# Patient Record
Sex: MALE | Race: WHITE
[De-identification: names, ages, dates, MRNs, and addresses within clinical notes are randomized per-mention and may not be internally consistent; named-entity substitution may affect disease eponyms.]

---

## 2018-08-08 PROBLEM — Z00.00 ENCOUNTER FOR PREVENTIVE HEALTH EXAMINATION: Status: ACTIVE | Noted: 2018-08-08

## 2018-08-21 ENCOUNTER — RECORD ABSTRACTING (OUTPATIENT)
Age: 63
End: 2018-08-21

## 2018-08-21 DIAGNOSIS — Z78.9 OTHER SPECIFIED HEALTH STATUS: ICD-10-CM

## 2018-08-21 DIAGNOSIS — Z80.9 FAMILY HISTORY OF MALIGNANT NEOPLASM, UNSPECIFIED: ICD-10-CM

## 2018-08-21 LAB — HBA1C MFR BLD: 6.96

## 2018-08-21 RX ORDER — LANCETS
EACH MISCELLANEOUS
Refills: 0 | Status: ACTIVE | COMMUNITY

## 2018-08-21 RX ORDER — GLUCAGON 1 MG
1 KIT INJECTION
Refills: 0 | Status: ACTIVE | COMMUNITY

## 2018-09-05 ENCOUNTER — APPOINTMENT (OUTPATIENT)
Dept: ENDOCRINOLOGY | Facility: CLINIC | Age: 63
End: 2018-09-05
Payer: COMMERCIAL

## 2018-09-05 PROCEDURE — G0108 DIAB MANAGE TRN  PER INDIV: CPT

## 2018-09-05 RX ORDER — INSULIN GLARGINE 300 U/ML
300 INJECTION, SOLUTION SUBCUTANEOUS
Refills: 0 | Status: DISCONTINUED | COMMUNITY
End: 2018-09-05

## 2018-09-05 RX ORDER — INSULIN LISPRO 100 [IU]/ML
100 INJECTION, SOLUTION INTRAVENOUS; SUBCUTANEOUS
Refills: 0 | Status: DISCONTINUED | COMMUNITY
End: 2018-09-05

## 2018-09-05 RX ORDER — INSULIN HUMAN 100 [IU]/ML
100 INJECTION, SUSPENSION SUBCUTANEOUS
Refills: 0 | Status: DISCONTINUED | COMMUNITY
End: 2018-09-05

## 2018-09-05 RX ORDER — INSULIN HUMAN 100 [IU]/ML
100 INJECTION, SOLUTION PARENTERAL
Refills: 0 | Status: DISCONTINUED | COMMUNITY
End: 2018-09-05

## 2018-09-05 RX ORDER — SYRINGE AND NEEDLE,INSULIN,1ML 31 GX5/16"
SYRINGE, EMPTY DISPOSABLE MISCELLANEOUS
Refills: 0 | Status: DISCONTINUED | COMMUNITY
End: 2018-09-05

## 2018-09-05 RX ORDER — INSULIN ASPART 100 [IU]/ML
100 INJECTION, SOLUTION INTRAVENOUS; SUBCUTANEOUS
Refills: 0 | Status: DISCONTINUED | COMMUNITY
End: 2018-09-05

## 2018-09-13 ENCOUNTER — RECORD ABSTRACTING (OUTPATIENT)
Age: 63
End: 2018-09-13

## 2018-09-14 ENCOUNTER — APPOINTMENT (OUTPATIENT)
Dept: ENDOCRINOLOGY | Facility: CLINIC | Age: 63
End: 2018-09-14
Payer: COMMERCIAL

## 2018-09-14 VITALS
SYSTOLIC BLOOD PRESSURE: 110 MMHG | HEIGHT: 70.5 IN | OXYGEN SATURATION: 98 % | DIASTOLIC BLOOD PRESSURE: 68 MMHG | BODY MASS INDEX: 19.25 KG/M2 | HEART RATE: 77 BPM | WEIGHT: 136 LBS

## 2018-09-14 PROCEDURE — 99214 OFFICE O/P EST MOD 30 MIN: CPT

## 2018-10-30 ENCOUNTER — MEDICATION RENEWAL (OUTPATIENT)
Age: 63
End: 2018-10-30

## 2018-11-07 ENCOUNTER — APPOINTMENT (OUTPATIENT)
Dept: ENDOCRINOLOGY | Facility: CLINIC | Age: 63
End: 2018-11-07
Payer: COMMERCIAL

## 2018-11-07 PROCEDURE — G0108 DIAB MANAGE TRN  PER INDIV: CPT

## 2018-11-08 ENCOUNTER — MEDICATION RENEWAL (OUTPATIENT)
Age: 63
End: 2018-11-08

## 2018-11-08 RX ORDER — BLOOD-GLUCOSE SENSOR
EACH MISCELLANEOUS
Qty: 3 | Refills: 1 | Status: DISCONTINUED | COMMUNITY
End: 2018-11-08

## 2018-11-08 RX ORDER — BLOOD-GLUCOSE TRANSMITTER
EACH MISCELLANEOUS
Refills: 0 | Status: DISCONTINUED | COMMUNITY
End: 2018-11-08

## 2018-12-13 ENCOUNTER — MEDICATION RENEWAL (OUTPATIENT)
Age: 63
End: 2018-12-13

## 2018-12-13 RX ORDER — BLOOD-GLUCOSE TRANSMITTER
EACH MISCELLANEOUS
Qty: 1 | Refills: 3 | Status: DISCONTINUED | OUTPATIENT
Start: 2018-11-08 | End: 2018-12-13

## 2018-12-13 RX ORDER — BLOOD-GLUCOSE SENSOR
EACH MISCELLANEOUS
Qty: 3 | Refills: 3 | Status: DISCONTINUED | COMMUNITY
Start: 2018-11-08 | End: 2018-12-13

## 2018-12-13 RX ORDER — BLOOD-GLUCOSE,RECEIVER,CONT
EACH MISCELLANEOUS
Qty: 1 | Refills: 0 | Status: DISCONTINUED | COMMUNITY
Start: 2018-11-08 | End: 2018-12-13

## 2019-01-11 ENCOUNTER — RECORD ABSTRACTING (OUTPATIENT)
Age: 64
End: 2019-01-11

## 2019-01-11 DIAGNOSIS — Z86.39 PERSONAL HISTORY OF OTHER ENDOCRINE, NUTRITIONAL AND METABOLIC DISEASE: ICD-10-CM

## 2019-01-11 DIAGNOSIS — Z86.018 PERSONAL HISTORY OF OTHER BENIGN NEOPLASM: ICD-10-CM

## 2019-01-11 DIAGNOSIS — Z86.79 PERSONAL HISTORY OF OTHER DISEASES OF THE CIRCULATORY SYSTEM: ICD-10-CM

## 2019-03-15 ENCOUNTER — APPOINTMENT (OUTPATIENT)
Dept: ENDOCRINOLOGY | Facility: CLINIC | Age: 64
End: 2019-03-15
Payer: COMMERCIAL

## 2019-03-15 VITALS
DIASTOLIC BLOOD PRESSURE: 72 MMHG | SYSTOLIC BLOOD PRESSURE: 140 MMHG | HEIGHT: 70.5 IN | HEART RATE: 79 BPM | BODY MASS INDEX: 19.11 KG/M2 | WEIGHT: 135 LBS

## 2019-03-15 PROCEDURE — 99214 OFFICE O/P EST MOD 30 MIN: CPT

## 2019-03-15 RX ORDER — INSULIN GLARGINE 300 U/ML
300 INJECTION, SOLUTION SUBCUTANEOUS
Refills: 0 | Status: DISCONTINUED | COMMUNITY
End: 2019-03-15

## 2019-03-15 NOTE — REVIEW OF SYSTEMS
[Recent Weight Gain (___ Lbs)] : no recent weight gain [FreeTextEntry8] : no groin pain; recent hernia surgery successful

## 2019-03-15 NOTE — ASSESSMENT
[FreeTextEntry1] : DM type 1, good avoidance of severe hypoglycemia, Discussed driving precautions; pt. to scan glucose prior to driving and treat if low, or before low if trend arrow down. Adjust insulin based on juan download; morning R 2 units, evening R 5 units\par hyperlipdemia, on therapy\par hypertension stable

## 2019-03-15 NOTE — HISTORY OF PRESENT ILLNESS
[FreeTextEntry1] : DM type:1\par Severity:severe\par Duration:40 years\par \par \par Associated symptoms or complications:retinopathy, hypoglycemia unawareness\par \par Current control: very variable\par \par PMH:\par s/p subdural hematoma\par \par Now back on N and R insulin - patient much happier with this regimen; syringes\par 17NPH, 3R\par 14NPH, 4R\par \par \par \par Past regimen:\par Toujeo 20 units - am\par Humalog pre-meal \par 		2 units\par 	150-199	3\par 	200-249	4\par 	250-299	5\par 	300+	6\par plus 2,2,4\par \par past - VGO 20 - stopped VGO due to hyperglycemia episode over 500\par tresiba 20 (before VGO) and Humalog scale as above\par

## 2019-03-21 ENCOUNTER — RECORD ABSTRACTING (OUTPATIENT)
Age: 64
End: 2019-03-21

## 2019-03-22 ENCOUNTER — APPOINTMENT (OUTPATIENT)
Dept: ENDOCRINOLOGY | Facility: CLINIC | Age: 64
End: 2019-03-22
Payer: COMMERCIAL

## 2019-03-22 PROCEDURE — G0108 DIAB MANAGE TRN  PER INDIV: CPT

## 2019-04-04 ENCOUNTER — APPOINTMENT (OUTPATIENT)
Dept: ENDOCRINOLOGY | Facility: CLINIC | Age: 64
End: 2019-04-04

## 2019-04-05 ENCOUNTER — MEDICATION RENEWAL (OUTPATIENT)
Age: 64
End: 2019-04-05

## 2019-04-15 ENCOUNTER — RX RENEWAL (OUTPATIENT)
Age: 64
End: 2019-04-15

## 2019-05-17 ENCOUNTER — APPOINTMENT (OUTPATIENT)
Dept: ENDOCRINOLOGY | Facility: CLINIC | Age: 64
End: 2019-05-17
Payer: COMMERCIAL

## 2019-05-17 LAB — HBA1C MFR BLD HPLC: 7.1

## 2019-05-17 PROCEDURE — 95251 CONT GLUC MNTR ANALYSIS I&R: CPT

## 2019-05-17 PROCEDURE — G0108 DIAB MANAGE TRN  PER INDIV: CPT

## 2019-05-17 RX ORDER — INSULIN GLARGINE 300 U/ML
300 INJECTION, SOLUTION SUBCUTANEOUS
Refills: 0 | Status: DISCONTINUED | COMMUNITY
End: 2019-05-17

## 2019-05-17 RX ORDER — INSULIN LISPRO 100 [IU]/ML
100 INJECTION, SOLUTION INTRAVENOUS; SUBCUTANEOUS
Refills: 0 | Status: DISCONTINUED | COMMUNITY
End: 2019-05-17

## 2019-05-17 RX ORDER — INSULIN ASPART 100 [IU]/ML
100 INJECTION, SOLUTION INTRAVENOUS; SUBCUTANEOUS
Refills: 0 | Status: DISCONTINUED | COMMUNITY
End: 2019-05-17

## 2019-05-17 RX ORDER — GLIPIZIDE 10 MG/1
10 TABLET ORAL
Refills: 0 | Status: DISCONTINUED | COMMUNITY
End: 2019-05-17

## 2019-07-25 ENCOUNTER — APPOINTMENT (OUTPATIENT)
Dept: ENDOCRINOLOGY | Facility: CLINIC | Age: 64
End: 2019-07-25

## 2019-07-25 ENCOUNTER — RX RENEWAL (OUTPATIENT)
Age: 64
End: 2019-07-25

## 2019-07-25 VITALS
DIASTOLIC BLOOD PRESSURE: 70 MMHG | WEIGHT: 133 LBS | HEIGHT: 70 IN | BODY MASS INDEX: 19.04 KG/M2 | HEART RATE: 81 BPM | SYSTOLIC BLOOD PRESSURE: 110 MMHG

## 2019-08-08 ENCOUNTER — APPOINTMENT (OUTPATIENT)
Dept: ENDOCRINOLOGY | Facility: CLINIC | Age: 64
End: 2019-08-08
Payer: COMMERCIAL

## 2019-08-08 PROCEDURE — G0108 DIAB MANAGE TRN  PER INDIV: CPT

## 2019-08-22 ENCOUNTER — RX RENEWAL (OUTPATIENT)
Age: 64
End: 2019-08-22

## 2019-09-19 ENCOUNTER — APPOINTMENT (OUTPATIENT)
Dept: ENDOCRINOLOGY | Facility: CLINIC | Age: 64
End: 2019-09-19

## 2019-11-18 ENCOUNTER — OTHER (OUTPATIENT)
Age: 64
End: 2019-11-18

## 2019-12-09 LAB
HBA1C MFR BLD HPLC: 7.1
LDLC SERPL DIRECT ASSAY-MCNC: 113
MICROALBUMIN/CREAT UR-RTO: 15

## 2019-12-11 ENCOUNTER — APPOINTMENT (OUTPATIENT)
Dept: ENDOCRINOLOGY | Facility: CLINIC | Age: 64
End: 2019-12-11
Payer: COMMERCIAL

## 2019-12-11 VITALS
SYSTOLIC BLOOD PRESSURE: 118 MMHG | OXYGEN SATURATION: 98 % | HEIGHT: 70 IN | BODY MASS INDEX: 19.33 KG/M2 | WEIGHT: 135 LBS | DIASTOLIC BLOOD PRESSURE: 64 MMHG | HEART RATE: 79 BPM

## 2019-12-11 PROCEDURE — 99214 OFFICE O/P EST MOD 30 MIN: CPT

## 2019-12-11 NOTE — ASSESSMENT
[FreeTextEntry1] : DM type 1, good avoidance of severe hypoglycemia, Discussed driving precautions; pt. to scan glucose prior to driving and treat if low, or before low if trend arrow down. Adjust insulin based on juan download; insulin regimen 16/2,11/5\par hyperlipdemia, on therapy\par hypertension stable

## 2019-12-11 NOTE — HISTORY OF PRESENT ILLNESS
[FreeTextEntry1] : DM type:1\par Severity:severe\par Duration:40 years\par \par \par Associated symptoms or complications:retinopathy, hypoglycemia unawareness\par \par Current control: very variable\par \par PMH:\par s/p subdural hematoma\par \par Now back on N and R insulin - patient much happier with this regimen; syringes\par 17NPH, 2R\par 12NPH, 6R\par \par \par \par Past regimen:\par Toujeo 20 units - am\par Humalog pre-meal \par 		2 units\par 	150-199	3\par 	200-249	4\par 	250-299	5\par 	300+	6\par plus 2,2,4\par \par past - VGO 20 - stopped VGO due to hyperglycemia episode over 500\par tresiba 20 (before VGO) and Humalog scale as above\par

## 2019-12-23 ENCOUNTER — APPOINTMENT (OUTPATIENT)
Dept: ENDOCRINOLOGY | Facility: CLINIC | Age: 64
End: 2019-12-23

## 2020-04-13 ENCOUNTER — APPOINTMENT (OUTPATIENT)
Dept: ENDOCRINOLOGY | Facility: CLINIC | Age: 65
End: 2020-04-13
Payer: COMMERCIAL

## 2020-04-13 PROCEDURE — G2012 BRIEF CHECK IN BY MD/QHP: CPT

## 2020-08-26 LAB
HBA1C MFR BLD HPLC: 6.9
LDLC SERPL DIRECT ASSAY-MCNC: 121
MICROALBUMIN/CREAT 24H UR-RTO: 17

## 2020-08-27 ENCOUNTER — RESULT CHARGE (OUTPATIENT)
Age: 65
End: 2020-08-27

## 2020-08-27 ENCOUNTER — APPOINTMENT (OUTPATIENT)
Dept: ENDOCRINOLOGY | Facility: CLINIC | Age: 65
End: 2020-08-27
Payer: COMMERCIAL

## 2020-08-27 VITALS
DIASTOLIC BLOOD PRESSURE: 70 MMHG | WEIGHT: 128.03 LBS | OXYGEN SATURATION: 97 % | HEIGHT: 70 IN | BODY MASS INDEX: 18.33 KG/M2 | SYSTOLIC BLOOD PRESSURE: 102 MMHG | HEART RATE: 80 BPM

## 2020-08-27 LAB — GLUCOSE BLDC GLUCOMTR-MCNC: 144

## 2020-08-27 PROCEDURE — 82962 GLUCOSE BLOOD TEST: CPT

## 2020-08-27 PROCEDURE — 99214 OFFICE O/P EST MOD 30 MIN: CPT | Mod: 25

## 2020-08-27 NOTE — HISTORY OF PRESENT ILLNESS
[FreeTextEntry1] : DM type:1\par Severity:severe\par Duration:41 years\par \par \par Associated symptoms or complications:retinopathy, hypoglycemia unawareness\par \par Current control: very variable\par \par PMH:\par s/p subdural hematoma\par \par Now back on N and R insulin - patient much happier with this regimen; syringes\par 16NPH, 2R\par 11NPH, 5R\par \par \par \par Past regimen:\par Toujeo 20 units - am\par Humalog pre-meal \par 		2 units\par 	150-199	3\par 	200-249	4\par 	250-299	5\par 	300+	6\par plus 2,2,4\par \par past - VGO 20 - stopped VGO due to hyperglycemia episode over 500\par tresiba 20 (before VGO) and Humalog scale as above\par

## 2020-08-27 NOTE — REVIEW OF SYSTEMS
[Recent Weight Loss (___ Lbs)] : recent weight loss: [unfilled] lbs [Chest Pain] : no chest pain [Shortness Of Breath] : no shortness of breath

## 2020-08-27 NOTE — ASSESSMENT
[FreeTextEntry1] : DM type 1, variable control. Decrease NPH by one unit\par hyperlipidemia, on therapy\par hypertension stable

## 2021-01-04 ENCOUNTER — APPOINTMENT (OUTPATIENT)
Dept: ENDOCRINOLOGY | Facility: CLINIC | Age: 66
End: 2021-01-04
Payer: COMMERCIAL

## 2021-01-04 VITALS
WEIGHT: 128 LBS | HEIGHT: 70 IN | BODY MASS INDEX: 18.32 KG/M2 | SYSTOLIC BLOOD PRESSURE: 120 MMHG | OXYGEN SATURATION: 99 % | HEART RATE: 84 BPM | DIASTOLIC BLOOD PRESSURE: 80 MMHG

## 2021-01-04 PROCEDURE — 99214 OFFICE O/P EST MOD 30 MIN: CPT

## 2021-01-04 PROCEDURE — 99072 ADDL SUPL MATRL&STAF TM PHE: CPT

## 2021-01-04 NOTE — HISTORY OF PRESENT ILLNESS
[FreeTextEntry1] : DM type:1\par Severity:severe\par Duration:42 years\par \par \par Associated symptoms or complications:retinopathy, hypoglycemia unawareness\par \par Current control: very variable\par \par PMH:\par s/p subdural hematoma\par \par Now back on N and R insulin - patient much happier with this regimen; syringes\par 15NPH, 2R\par 10NPH, 5R\par \par \par \par Past regimen:\par Toujeo 20 units - am\par Humalog pre-meal \par 		2 units\par 	150-199	3\par 	200-249	4\par 	250-299	5\par 	300+	6\par plus 2,2,4\par \par past - VGO 20 - stopped VGO due to hyperglycemia episode over 500\par tresiba 20 (before VGO) and Humalog scale as above\par

## 2021-02-24 ENCOUNTER — APPOINTMENT (OUTPATIENT)
Dept: ENDOCRINOLOGY | Facility: CLINIC | Age: 66
End: 2021-02-24
Payer: COMMERCIAL

## 2021-02-24 LAB — HBA1C MFR BLD HPLC: 7.7

## 2021-02-24 PROCEDURE — G0108 DIAB MANAGE TRN  PER INDIV: CPT

## 2021-02-24 PROCEDURE — 99072 ADDL SUPL MATRL&STAF TM PHE: CPT

## 2021-03-26 ENCOUNTER — APPOINTMENT (OUTPATIENT)
Dept: ENDOCRINOLOGY | Facility: CLINIC | Age: 66
End: 2021-03-26

## 2021-05-11 LAB
HBA1C MFR BLD HPLC: 8.1
LDLC SERPL DIRECT ASSAY-MCNC: 84

## 2021-05-13 ENCOUNTER — APPOINTMENT (OUTPATIENT)
Dept: ENDOCRINOLOGY | Facility: CLINIC | Age: 66
End: 2021-05-13
Payer: COMMERCIAL

## 2021-05-13 VITALS
DIASTOLIC BLOOD PRESSURE: 70 MMHG | HEART RATE: 85 BPM | BODY MASS INDEX: 17.9 KG/M2 | OXYGEN SATURATION: 97 % | SYSTOLIC BLOOD PRESSURE: 110 MMHG | WEIGHT: 125 LBS | HEIGHT: 70 IN

## 2021-05-13 PROCEDURE — 99072 ADDL SUPL MATRL&STAF TM PHE: CPT

## 2021-05-13 PROCEDURE — 99214 OFFICE O/P EST MOD 30 MIN: CPT

## 2021-05-13 NOTE — ASSESSMENT
[FreeTextEntry1] : DM type 1, variable control. No changes needed\par hyperlipidemia, on therapy\par hypertension stable

## 2021-05-13 NOTE — HISTORY OF PRESENT ILLNESS
[FreeTextEntry1] : DM type:1\par Severity:severe\par Duration:42 years\par \par \par Associated symptoms or complications:retinopathy, hypoglycemia unawareness\par has foot wound, followed by podiatry and going to wound center. ON levofloxin and silvadene \par \par Current control: very variable\par \par PMH:\par s/p subdural hematoma\par \par Now back on N and R insulin - patient much happier with this regimen; syringes\par 15NPH, 2R\par 10NPH, 5R\par \par \par \par Past regimen:\par Toujeo 20 units - am\par Humalog pre-meal \par 		2 units\par 	150-199	3\par 	200-249	4\par 	250-299	5\par 	300+	6\par plus 2,2,4\par \par past - VGO 20 - stopped VGO due to hyperglycemia episode over 500\par tresiba 20 (before VGO) and Humalog scale as above\par

## 2021-07-20 ENCOUNTER — APPOINTMENT (OUTPATIENT)
Dept: ENDOCRINOLOGY | Facility: CLINIC | Age: 66
End: 2021-07-20
Payer: COMMERCIAL

## 2021-07-20 PROCEDURE — 99072 ADDL SUPL MATRL&STAF TM PHE: CPT

## 2021-07-20 PROCEDURE — G0108 DIAB MANAGE TRN  PER INDIV: CPT

## 2021-08-24 ENCOUNTER — APPOINTMENT (OUTPATIENT)
Dept: ENDOCRINOLOGY | Facility: CLINIC | Age: 66
End: 2021-08-24
Payer: COMMERCIAL

## 2021-08-24 PROCEDURE — G0108 DIAB MANAGE TRN  PER INDIV: CPT

## 2021-08-25 LAB — HBA1C MFR BLD HPLC: 6.8

## 2021-08-30 LAB
LDLC SERPL DIRECT ASSAY-MCNC: 126
MICROALBUMIN/CREAT 24H UR-RTO: 10

## 2021-09-03 ENCOUNTER — APPOINTMENT (OUTPATIENT)
Dept: ENDOCRINOLOGY | Facility: CLINIC | Age: 66
End: 2021-09-03
Payer: MEDICARE

## 2021-09-03 VITALS
DIASTOLIC BLOOD PRESSURE: 70 MMHG | HEIGHT: 70 IN | HEART RATE: 76 BPM | SYSTOLIC BLOOD PRESSURE: 110 MMHG | WEIGHT: 120.5 LBS | BODY MASS INDEX: 17.25 KG/M2

## 2021-09-03 PROCEDURE — 95251 CONT GLUC MNTR ANALYSIS I&R: CPT

## 2021-09-03 PROCEDURE — 99214 OFFICE O/P EST MOD 30 MIN: CPT | Mod: 25

## 2021-09-12 ENCOUNTER — RX RENEWAL (OUTPATIENT)
Age: 66
End: 2021-09-12

## 2021-10-26 ENCOUNTER — APPOINTMENT (OUTPATIENT)
Dept: ENDOCRINOLOGY | Facility: CLINIC | Age: 66
End: 2021-10-26
Payer: MEDICARE

## 2021-10-26 PROCEDURE — G0108 DIAB MANAGE TRN  PER INDIV: CPT

## 2021-10-29 ENCOUNTER — NON-APPOINTMENT (OUTPATIENT)
Age: 66
End: 2021-10-29

## 2021-12-14 ENCOUNTER — APPOINTMENT (OUTPATIENT)
Dept: ENDOCRINOLOGY | Facility: CLINIC | Age: 66
End: 2021-12-14
Payer: MEDICARE

## 2021-12-14 DIAGNOSIS — E10.65 TYPE 1 DIABETES MELLITUS WITH HYPERGLYCEMIA: ICD-10-CM

## 2021-12-14 PROCEDURE — 97803 MED NUTRITION INDIV SUBSEQ: CPT

## 2021-12-21 ENCOUNTER — NON-APPOINTMENT (OUTPATIENT)
Age: 66
End: 2021-12-21

## 2022-01-05 NOTE — HISTORY OF PRESENT ILLNESS
[Continuous Glucose Monitoring] : Continuous Glucose Monitoring: Yes [[place]] : [place] [FreeTextEntry1] : DM type:1\par Severity:severe\par Duration:42 years\par \par \par Associated symptoms or complications:retinopathy, hypoglycemia unawareness\par has foot wound, followed by podiatry and going to wound center. ON levofloxin and silvadene \par \par Current control: very variable\par \par PMH:\par s/p subdural hematoma\par \par Now back on N and R insulin - patient much happier with this regimen; syringes\par 15NPH, 2R\par 10NPH, 5R\par \par started jardiance and noted decreased urination in the afternoon\par \par \par \par Past regimen:\par Toujeo 20 units - am\par Humalog pre-meal \par 		2 units\par 	150-199	3\par 	200-249	4\par 	250-299	5\par 	300+	6\par plus 2,2,4\par \par past - VGO 20 - stopped VGO due to hyperglycemia episode over 500\par tresiba 20 (before VGO) and Humalog scale as above\par  [FreeTextEntry2] : 57 [FreeTextEntry3] : 33 [FreeTextEntry4] : 10 [de-identified] : 7.1 [FreeTextEntry5] : 158 [FreeTextEntry6] : 51.7

## 2022-01-05 NOTE — ASSESSMENT
[FreeTextEntry1] : DM type 1, variable control. d/c jardiance due to underlying type 1 DM and risk of DKA\par hyperlipidemia, on therapy\par hypertension stable\par \par Pt. was given a no cost juan 14 day reader by our office on December 13 2018

## 2022-01-10 RX ORDER — FLASH GLUCOSE SENSOR
KIT MISCELLANEOUS
Qty: 6 | Refills: 1 | Status: ACTIVE | COMMUNITY
Start: 2022-01-10 | End: 1900-01-01

## 2022-02-09 ENCOUNTER — APPOINTMENT (OUTPATIENT)
Dept: ENDOCRINOLOGY | Facility: CLINIC | Age: 67
End: 2022-02-09
Payer: MEDICARE

## 2022-02-09 PROCEDURE — 97803 MED NUTRITION INDIV SUBSEQ: CPT

## 2022-04-29 LAB
HBA1C MFR BLD HPLC: 7.1
LDLC SERPL DIRECT ASSAY-MCNC: 142
MICROALBUMIN/CREAT 24H UR-RTO: 10

## 2022-05-02 ENCOUNTER — APPOINTMENT (OUTPATIENT)
Dept: ENDOCRINOLOGY | Facility: CLINIC | Age: 67
End: 2022-05-02
Payer: MEDICARE

## 2022-05-02 VITALS
HEIGHT: 70 IN | DIASTOLIC BLOOD PRESSURE: 70 MMHG | BODY MASS INDEX: 17.32 KG/M2 | WEIGHT: 121 LBS | SYSTOLIC BLOOD PRESSURE: 130 MMHG | HEART RATE: 85 BPM

## 2022-05-02 DIAGNOSIS — I10 ESSENTIAL (PRIMARY) HYPERTENSION: ICD-10-CM

## 2022-05-02 PROCEDURE — 99214 OFFICE O/P EST MOD 30 MIN: CPT | Mod: 25

## 2022-05-02 PROCEDURE — 95251 CONT GLUC MNTR ANALYSIS I&R: CPT

## 2022-05-02 NOTE — ASSESSMENT
[FreeTextEntry1] : DM type 1,extreme variability. Pt. is eating one meal a day at random times. Injects his insulin q12h (7:30) without regard to meals. Discussed hypoglycemia in an effort to limit pm dose, but pt. reluctant to make changes because he knows his body. ADvised ongoing use of juan device (he did not like the dexcom) and to pay attention to readings. Has follow up with [facility]; maybe work in some snacks?\par hyperlipidemia, on therapy\par hypertension stable\par \par

## 2022-05-02 NOTE — HISTORY OF PRESENT ILLNESS
[Continuous Glucose Monitoring] : Continuous Glucose Monitoring: Yes [[place]] : [place] [FreeTextEntry1] : DM type:1\par Severity:severe\par Duration:43 years\par \par \par Associated symptoms or complications:retinopathy, hypoglycemia unawareness\par had foot wound, healed\par \par Current control: very variable\par \par PMH:\par s/p subdural hematoma\par \par Now back on N and R insulin - patient much happier with this regimen; syringes\par 15NPH, 2R\par 9NPH, 11R - or higher if glucose high\par \par started jardiance and noted decreased urination in the afternoon\par \par \par \par Past regimen:\par Toujeo 20 units - am\par Humalog pre-meal \par 		2 units\par 	150-199	3\par 	200-249	4\par 	250-299	5\par 	300+	6\par plus 2,2,4\par \par past - VGO 20 - stopped VGO due to hyperglycemia episode over 500\par tresiba 20 (before VGO) and Humalog scale as above\par  [FreeTextEntry2] : 42 [FreeTextEntry3] : 50 [FreeTextEntry4] : 8 [de-identified] : 8.5 [FreeTextEntry5] : 215 [FreeTextEntry6] : 60.2

## 2022-06-14 ENCOUNTER — APPOINTMENT (OUTPATIENT)
Dept: ENDOCRINOLOGY | Facility: CLINIC | Age: 67
End: 2022-06-14
Payer: MEDICARE

## 2022-06-14 PROCEDURE — 97803 MED NUTRITION INDIV SUBSEQ: CPT

## 2022-07-27 ENCOUNTER — APPOINTMENT (OUTPATIENT)
Dept: ENDOCRINOLOGY | Facility: CLINIC | Age: 67
End: 2022-07-27

## 2022-07-27 PROCEDURE — G0108 DIAB MANAGE TRN  PER INDIV: CPT

## 2022-09-14 ENCOUNTER — APPOINTMENT (OUTPATIENT)
Dept: ENDOCRINOLOGY | Facility: CLINIC | Age: 67
End: 2022-09-14

## 2022-09-14 VITALS
HEART RATE: 79 BPM | SYSTOLIC BLOOD PRESSURE: 120 MMHG | BODY MASS INDEX: 17.75 KG/M2 | HEIGHT: 70 IN | WEIGHT: 124 LBS | DIASTOLIC BLOOD PRESSURE: 70 MMHG

## 2022-09-14 PROCEDURE — 99214 OFFICE O/P EST MOD 30 MIN: CPT | Mod: 25

## 2022-09-14 PROCEDURE — 95251 CONT GLUC MNTR ANALYSIS I&R: CPT

## 2022-09-14 NOTE — ASSESSMENT
[FreeTextEntry1] : DM type 1,extreme variability. Pt. is eating one meal a day at random times. Injects his insulin q12h (7:30) without regard to meals. Discussed hypoglycemia in an effort to limit pm dose, but pt. reluctant to make changes because he knows his body. ADvised ongoing use of juan device (he did not like the dexcom) and to pay attention to readings. Encouraged to reduce insulin dose.\par hyperlipidemia, on therapy\par hypertension stable\par \par

## 2022-09-14 NOTE — HISTORY OF PRESENT ILLNESS
[Continuous Glucose Monitoring] : Continuous Glucose Monitoring: Yes [[place]] : [place] [FreeTextEntry1] : DM type:1\par Severity:severe\par Duration:43 years\par \par \par Associated symptoms or complications:retinopathy, hypoglycemia unawareness\par had foot wound, healed\par \par Current control: very variable\par \par PMH:\par s/p subdural hematoma\par \par Now back on N and R insulin - patient much happier with this regimen; syringes\par 15NPH, 3R\par 9NPH, 11R - or higher if glucose high\par \par started jardiance and noted decreased urination in the afternoon\par \par \par \par Past regimen:\par Toujeo 20 units - am\par Humalog pre-meal \par 		2 units\par 	150-199	3\par 	200-249	4\par 	250-299	5\par 	300+	6\par plus 2,2,4\par \par past - VGO 20 - stopped VGO due to hyperglycemia episode over 500\par tresiba 20 (before VGO) and Humalog scale as above\par  [FreeTextEntry2] : 58 [FreeTextEntry3] : 26 [FreeTextEntry4] : 16 [de-identified] : 6.8 [FreeTextEntry5] : 147 [FreeTextEntry6] : 56.6

## 2022-09-22 ENCOUNTER — APPOINTMENT (OUTPATIENT)
Dept: ENDOCRINOLOGY | Facility: CLINIC | Age: 67
End: 2022-09-22

## 2022-10-03 ENCOUNTER — RX RENEWAL (OUTPATIENT)
Age: 67
End: 2022-10-03

## 2022-10-26 ENCOUNTER — APPOINTMENT (OUTPATIENT)
Dept: ENDOCRINOLOGY | Facility: CLINIC | Age: 67
End: 2022-10-26

## 2022-10-26 PROCEDURE — 97803 MED NUTRITION INDIV SUBSEQ: CPT | Mod: GA

## 2022-11-16 ENCOUNTER — RX RENEWAL (OUTPATIENT)
Age: 67
End: 2022-11-16

## 2022-12-12 ENCOUNTER — OFFICE (OUTPATIENT)
Dept: URBAN - METROPOLITAN AREA CLINIC 105 | Facility: CLINIC | Age: 67
Setting detail: OPHTHALMOLOGY
End: 2022-12-12
Payer: MEDICARE

## 2022-12-12 DIAGNOSIS — E10.3312: ICD-10-CM

## 2022-12-12 DIAGNOSIS — E10.3313: ICD-10-CM

## 2022-12-12 PROCEDURE — 92134 CPTRZ OPH DX IMG PST SGM RTA: CPT | Performed by: OPHTHALMOLOGY

## 2022-12-12 PROCEDURE — 92235 FLUORESCEIN ANGRPH MLTIFRAME: CPT | Performed by: OPHTHALMOLOGY

## 2022-12-12 PROCEDURE — 67210 TREATMENT OF RETINAL LESION: CPT | Performed by: OPHTHALMOLOGY

## 2022-12-12 ASSESSMENT — VISUAL ACUITY
OS_BCVA: 20/40-1
OD_BCVA: 20/40

## 2022-12-12 ASSESSMENT — AXIALLENGTH_DERIVED
OD_AL: 23.8408
OS_AL: 24.0443

## 2022-12-12 ASSESSMENT — KERATOMETRY
OS_K2POWER_DIOPTERS: 44.00
OD_K2POWER_DIOPTERS: 44.25
OS_K1POWER_DIOPTERS: 43.50
OD_K1POWER_DIOPTERS: 43.00
OD_AXISANGLE_DEGREES: 017
OS_AXISANGLE_DEGREES: 171

## 2022-12-12 ASSESSMENT — REFRACTION_AUTOREFRACTION
OS_CYLINDER: -1.75
OD_CYLINDER: -2.00
OS_SPHERE: -0.50
OS_AXIS: 075
OD_AXIS: 116
OD_SPHERE: +0.25

## 2022-12-12 ASSESSMENT — SPHEQUIV_DERIVED
OS_SPHEQUIV: -1.375
OD_SPHEQUIV: -0.75

## 2022-12-12 ASSESSMENT — CONFRONTATIONAL VISUAL FIELD TEST (CVF)
OS_FINDINGS: FULL
OD_FINDINGS: FULL

## 2022-12-13 ENCOUNTER — APPOINTMENT (OUTPATIENT)
Dept: ENDOCRINOLOGY | Facility: CLINIC | Age: 67
End: 2022-12-13

## 2022-12-13 PROCEDURE — G0108 DIAB MANAGE TRN  PER INDIV: CPT

## 2022-12-13 RX ORDER — BLOOD SUGAR DIAGNOSTIC
STRIP MISCELLANEOUS 3 TIMES DAILY
Qty: 6 | Refills: 1 | Status: DISCONTINUED | COMMUNITY
Start: 2022-01-10 | End: 2022-12-13

## 2022-12-13 RX ORDER — BLOOD SUGAR DIAGNOSTIC
STRIP MISCELLANEOUS 4 TIMES DAILY
Qty: 400 | Refills: 3 | Status: DISCONTINUED | COMMUNITY
End: 2022-12-13

## 2022-12-13 RX ORDER — FLASH GLUCOSE SCANNING READER
EACH MISCELLANEOUS
Qty: 1 | Refills: 0 | Status: DISCONTINUED | COMMUNITY
Start: 2018-12-13 | End: 2022-12-13

## 2022-12-13 RX ORDER — BLOOD SUGAR DIAGNOSTIC
STRIP MISCELLANEOUS
Qty: 300 | Refills: 3 | Status: DISCONTINUED | COMMUNITY
Start: 2020-08-27 | End: 2022-12-13

## 2022-12-13 RX ORDER — FLASH GLUCOSE SENSOR
KIT MISCELLANEOUS
Qty: 6 | Refills: 3 | Status: DISCONTINUED | COMMUNITY
Start: 2018-12-13 | End: 2022-12-13

## 2023-01-23 ENCOUNTER — OFFICE (OUTPATIENT)
Dept: URBAN - METROPOLITAN AREA CLINIC 105 | Facility: CLINIC | Age: 68
Setting detail: OPHTHALMOLOGY
End: 2023-01-23
Payer: MEDICARE

## 2023-01-23 DIAGNOSIS — E10.3311: ICD-10-CM

## 2023-01-23 PROCEDURE — 67210 TREATMENT OF RETINAL LESION: CPT | Performed by: OPHTHALMOLOGY

## 2023-01-23 ASSESSMENT — KERATOMETRY
OD_K2POWER_DIOPTERS: 44.25
OD_K1POWER_DIOPTERS: 43.00
OS_K2POWER_DIOPTERS: 44.00
OD_AXISANGLE_DEGREES: 017
OS_AXISANGLE_DEGREES: 171
OS_K1POWER_DIOPTERS: 43.50

## 2023-01-23 ASSESSMENT — AXIALLENGTH_DERIVED
OD_AL: 23.8408
OS_AL: 24.0443

## 2023-01-23 ASSESSMENT — REFRACTION_AUTOREFRACTION
OS_AXIS: 075
OD_AXIS: 116
OD_SPHERE: +0.25
OS_CYLINDER: -1.75
OD_CYLINDER: -2.00
OS_SPHERE: -0.50

## 2023-01-23 ASSESSMENT — SPHEQUIV_DERIVED
OS_SPHEQUIV: -1.375
OD_SPHEQUIV: -0.75

## 2023-01-23 ASSESSMENT — CONFRONTATIONAL VISUAL FIELD TEST (CVF)
OD_FINDINGS: FULL
OS_FINDINGS: FULL

## 2023-01-23 ASSESSMENT — VISUAL ACUITY
OD_BCVA: 20/40-2
OS_BCVA: 20/40

## 2023-02-14 ENCOUNTER — APPOINTMENT (OUTPATIENT)
Dept: ENDOCRINOLOGY | Facility: CLINIC | Age: 68
End: 2023-02-14
Payer: MEDICARE

## 2023-02-14 LAB
HBA1C MFR BLD HPLC: 7
LDLC SERPL DIRECT ASSAY-MCNC: 144
MICROALBUMIN/CREAT 24H UR-RTO: 19
TSH SERPL-ACNC: 2.14

## 2023-02-14 PROCEDURE — 97803 MED NUTRITION INDIV SUBSEQ: CPT | Mod: GA

## 2023-02-15 ENCOUNTER — APPOINTMENT (OUTPATIENT)
Dept: ENDOCRINOLOGY | Facility: CLINIC | Age: 68
End: 2023-02-15
Payer: MEDICARE

## 2023-02-15 VITALS
HEIGHT: 70 IN | DIASTOLIC BLOOD PRESSURE: 70 MMHG | HEART RATE: 85 BPM | WEIGHT: 130 LBS | SYSTOLIC BLOOD PRESSURE: 124 MMHG | BODY MASS INDEX: 18.61 KG/M2

## 2023-02-15 PROCEDURE — 99214 OFFICE O/P EST MOD 30 MIN: CPT

## 2023-02-15 NOTE — HISTORY OF PRESENT ILLNESS
[Continuous Glucose Monitoring] : Continuous Glucose Monitoring: Yes [[place]] : [place] [FreeTextEntry1] : DM type:1\par Severity:severe\par Duration:44 years\par \par \par Associated symptoms or complications:retinopathy, hypoglycemia unawareness\par had foot wound, healed\par \par Current control: very variable\par \par PMH:\par s/p subdural hematoma\par \par Now back on N and R insulin - patient much happier with this regimen; syringes\par 15NPH, 3R\par 9NPH, 11R - or higher if glucose high\par \par started jardiance and noted decreased urination in the afternoon\par \par Past regimen:\par Toujeo 20 units - am\par Humalog pre-meal \par 		2 units\par 	150-199	3\par 	200-249	4\par 	250-299	5\par 	300+	6\par plus 2,2,4\par \par past - VGO 20 - stopped VGO due to hyperglycemia episode over 500\par tresiba 20 (before VGO) and Humalog scale as above\par  [FreeTextEntry2] : 48 [FreeTextEntry3] : 44 [FreeTextEntry4] : 10 [de-identified] : 7.7 [FreeTextEntry5] : 182 [FreeTextEntry6] : 53.8

## 2023-02-15 NOTE — ASSESSMENT
[FreeTextEntry1] : DM type 1,extreme variability. Pt. is not amenable to recommendations for insulin use\par hyperlipidemia, on therapy. INcrease simvastatin to 40 mg\par hypertension stable\par \par

## 2023-04-11 ENCOUNTER — OFFICE (OUTPATIENT)
Dept: URBAN - METROPOLITAN AREA CLINIC 105 | Facility: CLINIC | Age: 68
Setting detail: OPHTHALMOLOGY
End: 2023-04-11
Payer: MEDICARE

## 2023-04-11 DIAGNOSIS — H40.033: ICD-10-CM

## 2023-04-11 DIAGNOSIS — H52.223: ICD-10-CM

## 2023-04-11 DIAGNOSIS — H25.013: ICD-10-CM

## 2023-04-11 DIAGNOSIS — H52.4: ICD-10-CM

## 2023-04-11 PROCEDURE — 99214 OFFICE O/P EST MOD 30 MIN: CPT | Performed by: OPTOMETRIST

## 2023-04-11 PROCEDURE — 92015 DETERMINE REFRACTIVE STATE: CPT | Performed by: OPTOMETRIST

## 2023-04-11 ASSESSMENT — AXIALLENGTH_DERIVED
OD_AL: 23.5461
OS_AL: 23.794
OS_AL: 23.6463
OD_AL: 23.5461

## 2023-04-11 ASSESSMENT — SPHEQUIV_DERIVED
OS_SPHEQUIV: -0.375
OD_SPHEQUIV: 0
OD_SPHEQUIV: 0
OS_SPHEQUIV: -0.75

## 2023-04-11 ASSESSMENT — CONFRONTATIONAL VISUAL FIELD TEST (CVF)
OD_FINDINGS: FULL
OS_FINDINGS: FULL

## 2023-04-11 ASSESSMENT — REFRACTION_CURRENTRX
OS_CYLINDER: -1.00
OS_AXIS: 082
OD_CYLINDER: -1.25
OD_SPHERE: -0.75
OD_AXIS: 118
OS_VPRISM_DIRECTION: PROGS
OS_OVR_VA: 20/
OS_SPHERE: -1.00
OD_VPRISM_DIRECTION: PROGS
OD_OVR_VA: 20/
OS_ADD: +1.75
OD_ADD: +1.75

## 2023-04-11 ASSESSMENT — VISUAL ACUITY
OS_BCVA: 20/40
OD_BCVA: 20/60-1

## 2023-04-11 ASSESSMENT — REFRACTION_MANIFEST
OS_AXIS: 070
OS_SPHERE: +0.25
OS_ADD: +2.50
OD_ADD: +2.50
OS_CYLINDER: -1.25
OU_VA: 20/30-2
OD_AXIS: 110
OS_VA1: 20/60
OD_VA1: 20/40
OD_CYLINDER: -1.50
OD_SPHERE: +0.75

## 2023-04-11 ASSESSMENT — REFRACTION_AUTOREFRACTION
OS_AXIS: 078
OS_CYLINDER: -1.50
OD_SPHERE: +1.25
OS_SPHERE: 0.00
OD_AXIS: 116
OD_CYLINDER: -2.50

## 2023-04-11 ASSESSMENT — KERATOMETRY
OS_K2POWER_DIOPTERS: 44.00
OS_AXISANGLE_DEGREES: 173
OD_K1POWER_DIOPTERS: 42.75
OS_K1POWER_DIOPTERS: 43.50
OD_K2POWER_DIOPTERS: 44.50
OD_AXISANGLE_DEGREES: 015

## 2023-04-11 ASSESSMENT — TONOMETRY
OD_IOP_MMHG: 17
OS_IOP_MMHG: 18

## 2023-04-17 ENCOUNTER — OFFICE (OUTPATIENT)
Dept: URBAN - METROPOLITAN AREA CLINIC 113 | Facility: CLINIC | Age: 68
Setting detail: OPHTHALMOLOGY
End: 2023-04-17
Payer: MEDICARE

## 2023-04-17 DIAGNOSIS — H40.033: ICD-10-CM

## 2023-04-17 DIAGNOSIS — H25.013: ICD-10-CM

## 2023-04-17 PROCEDURE — 92020 GONIOSCOPY: CPT | Performed by: STUDENT IN AN ORGANIZED HEALTH CARE EDUCATION/TRAINING PROGRAM

## 2023-04-17 PROCEDURE — 99214 OFFICE O/P EST MOD 30 MIN: CPT | Performed by: STUDENT IN AN ORGANIZED HEALTH CARE EDUCATION/TRAINING PROGRAM

## 2023-04-17 ASSESSMENT — KERATOMETRY
OS_AXISANGLE_DEGREES: 179
OS_K2POWER_DIOPTERS: 44.00
OD_K1POWER_DIOPTERS: 43.00
OD_AXISANGLE_DEGREES: 011
OS_K1POWER_DIOPTERS: 43.00
OD_K2POWER_DIOPTERS: 44.25

## 2023-04-17 ASSESSMENT — REFRACTION_CURRENTRX
OD_VPRISM_DIRECTION: PROGS
OD_SPHERE: -0.75
OD_ADD: +1.75
OD_CYLINDER: -1.25
OS_CYLINDER: -1.00
OD_OVR_VA: 20/
OS_VPRISM_DIRECTION: PROGS
OS_AXIS: 082
OS_ADD: +1.75
OD_AXIS: 118
OS_SPHERE: -1.00
OS_OVR_VA: 20/

## 2023-04-17 ASSESSMENT — CONFRONTATIONAL VISUAL FIELD TEST (CVF)
OS_FINDINGS: FULL
OD_FINDINGS: FULL

## 2023-04-17 ASSESSMENT — REFRACTION_MANIFEST
OD_SPHERE: +0.75
OD_ADD: +2.50
OS_CYLINDER: -1.25
OD_VA1: 20/40
OS_ADD: +2.50
OS_VA1: 20/60
OD_CYLINDER: -1.50
OS_AXIS: 070
OD_AXIS: 110
OU_VA: 20/30-2
OS_SPHERE: +0.25

## 2023-04-17 ASSESSMENT — REFRACTION_AUTOREFRACTION
OD_AXIS: 116
OD_SPHERE: +1.25
OS_SPHERE: 0.00
OS_CYLINDER: -1.50
OD_CYLINDER: -2.50
OS_AXIS: 078

## 2023-04-17 ASSESSMENT — AXIALLENGTH_DERIVED
OD_AL: 23.5461
OD_AL: 23.5461
OS_AL: 23.8878
OS_AL: 23.7389

## 2023-04-17 ASSESSMENT — TONOMETRY
OS_IOP_MMHG: 17
OD_IOP_MMHG: 16

## 2023-04-17 ASSESSMENT — SPHEQUIV_DERIVED
OD_SPHEQUIV: 0
OS_SPHEQUIV: -0.75
OD_SPHEQUIV: 0
OS_SPHEQUIV: -0.375

## 2023-04-17 ASSESSMENT — VISUAL ACUITY
OS_BCVA: 20/25-1
OD_BCVA: 20/60-1

## 2023-05-02 ENCOUNTER — APPOINTMENT (OUTPATIENT)
Dept: ENDOCRINOLOGY | Facility: CLINIC | Age: 68
End: 2023-05-02
Payer: MEDICARE

## 2023-05-02 PROCEDURE — 97803 MED NUTRITION INDIV SUBSEQ: CPT | Mod: GA

## 2023-05-03 ENCOUNTER — OFFICE (OUTPATIENT)
Dept: URBAN - METROPOLITAN AREA CLINIC 105 | Facility: CLINIC | Age: 68
Setting detail: OPHTHALMOLOGY
End: 2023-05-03
Payer: MEDICARE

## 2023-05-03 DIAGNOSIS — H40.031: ICD-10-CM

## 2023-05-03 PROBLEM — H25.013 CORTICAL CATARACT; BOTH EYES: Status: ACTIVE | Noted: 2023-04-11

## 2023-05-03 PROBLEM — H52.223 ASTIGMATISM; BOTH EYES: Status: ACTIVE | Noted: 2023-04-11

## 2023-05-03 PROCEDURE — 66761 REVISION OF IRIS: CPT | Performed by: STUDENT IN AN ORGANIZED HEALTH CARE EDUCATION/TRAINING PROGRAM

## 2023-05-03 ASSESSMENT — REFRACTION_MANIFEST
OD_AXIS: 110
OS_ADD: +2.50
OS_SPHERE: +0.25
OD_CYLINDER: -1.50
OD_ADD: +2.50
OD_SPHERE: +0.75
OS_AXIS: 070
OS_CYLINDER: -1.25
OS_VA1: 20/60
OU_VA: 20/30-2
OD_VA1: 20/40

## 2023-05-03 ASSESSMENT — SPHEQUIV_DERIVED
OD_SPHEQUIV: 0
OS_SPHEQUIV: -0.375
OS_SPHEQUIV: -0.75
OD_SPHEQUIV: 0

## 2023-05-03 ASSESSMENT — REFRACTION_CURRENTRX
OS_SPHERE: -1.00
OD_AXIS: 118
OS_OVR_VA: 20/
OS_AXIS: 082
OS_VPRISM_DIRECTION: PROGS
OS_ADD: +1.75
OD_CYLINDER: -1.25
OS_CYLINDER: -1.00
OD_ADD: +1.75
OD_SPHERE: -0.75
OD_OVR_VA: 20/
OD_VPRISM_DIRECTION: PROGS

## 2023-05-03 ASSESSMENT — KERATOMETRY
OD_K1POWER_DIOPTERS: 43.00
OD_K2POWER_DIOPTERS: 44.25
OS_K1POWER_DIOPTERS: 43.00
OS_K2POWER_DIOPTERS: 44.00
OS_AXISANGLE_DEGREES: 179
OD_AXISANGLE_DEGREES: 011

## 2023-05-03 ASSESSMENT — REFRACTION_AUTOREFRACTION
OD_SPHERE: +1.25
OS_AXIS: 078
OD_AXIS: 116
OS_CYLINDER: -1.50
OS_SPHERE: 0.00
OD_CYLINDER: -2.50

## 2023-05-03 ASSESSMENT — VISUAL ACUITY
OS_BCVA: 20/25-1
OD_BCVA: 20/60-1

## 2023-05-03 ASSESSMENT — AXIALLENGTH_DERIVED
OS_AL: 23.8878
OD_AL: 23.5461
OD_AL: 23.5461
OS_AL: 23.7389

## 2023-05-03 ASSESSMENT — CONFRONTATIONAL VISUAL FIELD TEST (CVF)
OS_FINDINGS: FULL
OD_FINDINGS: FULL

## 2023-05-24 ENCOUNTER — OFFICE (OUTPATIENT)
Dept: URBAN - METROPOLITAN AREA CLINIC 105 | Facility: CLINIC | Age: 68
Setting detail: OPHTHALMOLOGY
End: 2023-05-24
Payer: MEDICARE

## 2023-05-24 DIAGNOSIS — H40.031: ICD-10-CM

## 2023-05-24 PROCEDURE — 99213 OFFICE O/P EST LOW 20 MIN: CPT | Performed by: STUDENT IN AN ORGANIZED HEALTH CARE EDUCATION/TRAINING PROGRAM

## 2023-05-24 ASSESSMENT — REFRACTION_MANIFEST
OD_ADD: +2.50
OS_SPHERE: +0.25
OS_VA1: 20/60
OD_SPHERE: +0.75
OD_CYLINDER: -1.50
OS_AXIS: 070
OD_VA1: 20/40
OS_ADD: +2.50
OS_CYLINDER: -1.25
OU_VA: 20/30-2
OD_AXIS: 110

## 2023-05-24 ASSESSMENT — SPHEQUIV_DERIVED
OS_SPHEQUIV: -0.375
OS_SPHEQUIV: -0.25
OD_SPHEQUIV: 0
OD_SPHEQUIV: 0.125

## 2023-05-24 ASSESSMENT — KERATOMETRY
OS_K2POWER_DIOPTERS: 44.25
OD_AXISANGLE_DEGREES: 010
OD_K1POWER_DIOPTERS: 43.00
OS_K1POWER_DIOPTERS: 43.50
OS_AXISANGLE_DEGREES: 172
OD_K2POWER_DIOPTERS: 44.50

## 2023-05-24 ASSESSMENT — REFRACTION_CURRENTRX
OD_CYLINDER: -1.25
OS_VPRISM_DIRECTION: PROGS
OS_ADD: +1.75
OD_AXIS: 118
OS_SPHERE: -1.00
OD_ADD: +1.75
OS_OVR_VA: 20/
OS_AXIS: 082
OD_OVR_VA: 20/
OD_VPRISM_DIRECTION: PROGS
OD_SPHERE: -0.75
OS_CYLINDER: -1.00

## 2023-05-24 ASSESSMENT — REFRACTION_AUTOREFRACTION
OS_CYLINDER: -1.50
OS_SPHERE: +0.50
OD_AXIS: 113
OD_SPHERE: +1.00
OD_CYLINDER: -1.75
OS_AXIS: 072

## 2023-05-24 ASSESSMENT — AXIALLENGTH_DERIVED
OD_AL: 23.4522
OS_AL: 23.5516
OS_AL: 23.6003
OD_AL: 23.5004

## 2023-05-24 ASSESSMENT — CONFRONTATIONAL VISUAL FIELD TEST (CVF)
OS_FINDINGS: FULL
OD_FINDINGS: FULL

## 2023-05-24 ASSESSMENT — VISUAL ACUITY
OS_BCVA: 20/30
OD_BCVA: 20/60

## 2023-05-24 ASSESSMENT — TONOMETRY: OD_IOP_MMHG: 17

## 2023-06-28 ENCOUNTER — OFFICE (OUTPATIENT)
Dept: URBAN - METROPOLITAN AREA CLINIC 105 | Facility: CLINIC | Age: 68
Setting detail: OPHTHALMOLOGY
End: 2023-06-28
Payer: MEDICARE

## 2023-06-28 DIAGNOSIS — H40.032: ICD-10-CM

## 2023-06-28 PROCEDURE — 66761 REVISION OF IRIS: CPT | Performed by: STUDENT IN AN ORGANIZED HEALTH CARE EDUCATION/TRAINING PROGRAM

## 2023-06-28 ASSESSMENT — AXIALLENGTH_DERIVED
OD_AL: 23.5004
OS_AL: 23.5516
OS_AL: 23.6003
OD_AL: 23.4522

## 2023-06-28 ASSESSMENT — REFRACTION_AUTOREFRACTION
OD_CYLINDER: -1.75
OD_AXIS: 113
OD_SPHERE: +1.00
OS_SPHERE: +0.50
OS_AXIS: 072
OS_CYLINDER: -1.50

## 2023-06-28 ASSESSMENT — REFRACTION_MANIFEST
OS_SPHERE: +0.25
OS_VA1: 20/60
OD_CYLINDER: -1.50
OD_ADD: +2.50
OU_VA: 20/30-2
OS_ADD: +2.50
OD_SPHERE: +0.75
OD_AXIS: 110
OD_VA1: 20/40
OS_CYLINDER: -1.25
OS_AXIS: 070

## 2023-06-28 ASSESSMENT — REFRACTION_CURRENTRX
OS_AXIS: 082
OS_VPRISM_DIRECTION: PROGS
OD_CYLINDER: -1.25
OS_SPHERE: -1.00
OD_VPRISM_DIRECTION: PROGS
OS_ADD: +1.75
OS_OVR_VA: 20/
OD_ADD: +1.75
OD_OVR_VA: 20/
OD_SPHERE: -0.75
OS_CYLINDER: -1.00
OD_AXIS: 118

## 2023-06-28 ASSESSMENT — KERATOMETRY
OS_AXISANGLE_DEGREES: 172
OD_K1POWER_DIOPTERS: 43.00
OS_K2POWER_DIOPTERS: 44.25
OS_K1POWER_DIOPTERS: 43.50
OD_AXISANGLE_DEGREES: 010
OD_K2POWER_DIOPTERS: 44.50

## 2023-06-28 ASSESSMENT — CONFRONTATIONAL VISUAL FIELD TEST (CVF)
OS_FINDINGS: FULL
OD_FINDINGS: FULL

## 2023-06-28 ASSESSMENT — SPHEQUIV_DERIVED
OD_SPHEQUIV: 0
OS_SPHEQUIV: -0.25
OD_SPHEQUIV: 0.125
OS_SPHEQUIV: -0.375

## 2023-06-28 ASSESSMENT — VISUAL ACUITY
OD_BCVA: 20/60
OS_BCVA: 20/30

## 2023-07-17 ENCOUNTER — RX RENEWAL (OUTPATIENT)
Age: 68
End: 2023-07-17

## 2023-07-23 ENCOUNTER — RX RENEWAL (OUTPATIENT)
Age: 68
End: 2023-07-23

## 2023-07-23 RX ORDER — SYRINGE AND NEEDLE,INSULIN,1ML 28GX1/2"
31G X 5/16" SYRINGE, EMPTY DISPOSABLE MISCELLANEOUS
Qty: 200 | Refills: 3 | Status: ACTIVE | COMMUNITY
Start: 2023-07-23 | End: 1900-01-01

## 2023-07-26 ENCOUNTER — OFFICE (OUTPATIENT)
Dept: URBAN - METROPOLITAN AREA CLINIC 105 | Facility: CLINIC | Age: 68
Setting detail: OPHTHALMOLOGY
End: 2023-07-26
Payer: MEDICARE

## 2023-07-26 DIAGNOSIS — H40.033: ICD-10-CM

## 2023-07-26 DIAGNOSIS — H25.13: ICD-10-CM

## 2023-07-26 DIAGNOSIS — E10.3312: ICD-10-CM

## 2023-07-26 DIAGNOSIS — E10.3391: ICD-10-CM

## 2023-07-26 PROCEDURE — 92134 CPTRZ OPH DX IMG PST SGM RTA: CPT | Performed by: STUDENT IN AN ORGANIZED HEALTH CARE EDUCATION/TRAINING PROGRAM

## 2023-07-26 PROCEDURE — 92014 COMPRE OPH EXAM EST PT 1/>: CPT | Performed by: STUDENT IN AN ORGANIZED HEALTH CARE EDUCATION/TRAINING PROGRAM

## 2023-07-26 PROCEDURE — 92020 GONIOSCOPY: CPT | Performed by: STUDENT IN AN ORGANIZED HEALTH CARE EDUCATION/TRAINING PROGRAM

## 2023-07-26 ASSESSMENT — KERATOMETRY
OS_K1POWER_DIOPTERS: 43.50
OD_AXISANGLE_DEGREES: 011
OD_K2POWER_DIOPTERS: 44.50
OD_K1POWER_DIOPTERS: 43.00
OS_K2POWER_DIOPTERS: 44.00
OS_AXISANGLE_DEGREES: 165

## 2023-07-26 ASSESSMENT — REFRACTION_AUTOREFRACTION
OS_SPHERE: 0.00
OD_CYLINDER: -2.50
OS_AXIS: 066
OD_AXIS: 118
OS_CYLINDER: -1.25
OD_SPHERE: +1.25

## 2023-07-26 ASSESSMENT — REFRACTION_CURRENTRX
OD_OVR_VA: 20/
OD_ADD: +2.50
OD_AXIS: 127
OS_SPHERE: +0.50
OS_OVR_VA: 20/
OS_VPRISM_DIRECTION: PROGS
OD_SPHERE: +2.00
OS_AXIS: 083
OD_VPRISM_DIRECTION: PROGS
OD_CYLINDER: -2.75
OS_ADD: +2.50
OS_CYLINDER: -0.75

## 2023-07-26 ASSESSMENT — SPHEQUIV_DERIVED
OS_SPHEQUIV: -0.625
OD_SPHEQUIV: 0
OD_SPHEQUIV: 0
OS_SPHEQUIV: -0.375

## 2023-07-26 ASSESSMENT — CONFRONTATIONAL VISUAL FIELD TEST (CVF)
OD_FINDINGS: FULL
OS_FINDINGS: FULL

## 2023-07-26 ASSESSMENT — REFRACTION_MANIFEST
OS_CYLINDER: -1.25
OS_ADD: +2.50
OD_VA1: 20/40
OU_VA: 20/30-2
OD_CYLINDER: -1.50
OD_SPHERE: +0.75
OD_AXIS: 110
OS_VA1: 20/60
OD_ADD: +2.50
OS_AXIS: 070
OS_SPHERE: +0.25

## 2023-07-26 ASSESSMENT — VISUAL ACUITY
OS_BCVA: 20/30-2
OD_BCVA: 20/70+

## 2023-07-26 ASSESSMENT — AXIALLENGTH_DERIVED
OD_AL: 23.5004
OD_AL: 23.5004
OS_AL: 23.7446
OS_AL: 23.6463

## 2023-07-26 ASSESSMENT — TONOMETRY
OD_IOP_MMHG: 19
OS_IOP_MMHG: 20

## 2023-08-03 ENCOUNTER — APPOINTMENT (OUTPATIENT)
Dept: ENDOCRINOLOGY | Facility: CLINIC | Age: 68
End: 2023-08-03
Payer: MEDICARE

## 2023-08-03 VITALS
WEIGHT: 128 LBS | HEART RATE: 85 BPM | DIASTOLIC BLOOD PRESSURE: 67 MMHG | BODY MASS INDEX: 18.32 KG/M2 | OXYGEN SATURATION: 97 % | HEIGHT: 70 IN | SYSTOLIC BLOOD PRESSURE: 107 MMHG

## 2023-08-03 DIAGNOSIS — E78.00 PURE HYPERCHOLESTEROLEMIA, UNSPECIFIED: ICD-10-CM

## 2023-08-03 PROCEDURE — 99214 OFFICE O/P EST MOD 30 MIN: CPT

## 2023-08-03 NOTE — ASSESSMENT
[FreeTextEntry1] : DM type 1,extreme variability. Pt. is not amenable to recommendations for insulin use, encouraged reduction in dosing to avoid lows hyperlipidemia, on therapy.  mild liver function abnormalities, will repeat in several weeks hypertension stable

## 2023-08-03 NOTE — HISTORY OF PRESENT ILLNESS
[Continuous Glucose Monitoring] : Continuous Glucose Monitoring: Yes [[place]] : [place] [FreeTextEntry1] : DM type:1 Severity:severe Duration:44 years   Associated symptoms or complications:retinopathy, hypoglycemia unawareness had foot wound, healed  Current control: very variable  PMH: s/p subdural hematoma  Now back on N and R insulin - patient much happier with this regimen; syringes 18NPH, 6R 9NPH, 11R - or higher if glucose high  started jardiance and noted decreased urination in the afternoon  Past regimen: Toujeo 20 units - am Humalog pre-meal  		2 units 	150-199	3 	200-249	4 	250-299	5 	300+	6 plus 2,2,4  past - VGO 20 - stopped VGO due to hyperglycemia episode over 500 tresiba 20 (before VGO) and Humalog scale as above  [FreeTextEntry2] : 48 [FreeTextEntry3] : 44 [FreeTextEntry4] : 10 [de-identified] : 7.7 [FreeTextEntry5] : 182 [FreeTextEntry6] : 53.8

## 2023-08-14 ENCOUNTER — OFFICE (OUTPATIENT)
Dept: URBAN - METROPOLITAN AREA CLINIC 105 | Facility: CLINIC | Age: 68
Setting detail: OPHTHALMOLOGY
End: 2023-08-14
Payer: MEDICARE

## 2023-08-14 DIAGNOSIS — E10.3313: ICD-10-CM

## 2023-08-14 DIAGNOSIS — E10.3312: ICD-10-CM

## 2023-08-14 PROBLEM — E10.3311 DM TYPE 1; RIGHT MOD WITH ME, LEFT MOD WITH ME: Status: ACTIVE | Noted: 2023-08-14

## 2023-08-14 PROCEDURE — 92235 FLUORESCEIN ANGRPH MLTIFRAME: CPT | Performed by: OPHTHALMOLOGY

## 2023-08-14 PROCEDURE — 67210 TREATMENT OF RETINAL LESION: CPT | Performed by: OPHTHALMOLOGY

## 2023-08-14 PROCEDURE — 92134 CPTRZ OPH DX IMG PST SGM RTA: CPT | Performed by: OPHTHALMOLOGY

## 2023-08-14 ASSESSMENT — TONOMETRY
OS_IOP_MMHG: 20
OD_IOP_MMHG: 21

## 2023-08-14 ASSESSMENT — SPHEQUIV_DERIVED
OD_SPHEQUIV: 0
OS_SPHEQUIV: -0.625

## 2023-08-14 ASSESSMENT — REFRACTION_CURRENTRX
OS_CYLINDER: -0.75
OD_VPRISM_DIRECTION: PROGS
OS_AXIS: 083
OS_SPHERE: +0.50
OS_OVR_VA: 20/
OD_CYLINDER: -2.75
OD_ADD: +2.50
OS_VPRISM_DIRECTION: PROGS
OD_SPHERE: +2.00
OS_ADD: +2.50
OD_AXIS: 127
OD_OVR_VA: 20/

## 2023-08-14 ASSESSMENT — VISUAL ACUITY
OS_BCVA: 20/30-
OD_BCVA: 20/70

## 2023-08-14 ASSESSMENT — REFRACTION_AUTOREFRACTION
OD_AXIS: 118
OS_AXIS: 066
OD_CYLINDER: -2.50
OS_CYLINDER: -1.25
OS_SPHERE: 0.00
OD_SPHERE: +1.25

## 2023-08-14 ASSESSMENT — KERATOMETRY
OS_K2POWER_DIOPTERS: 44.00
OD_AXISANGLE_DEGREES: 011
OD_K2POWER_DIOPTERS: 44.50
OD_K1POWER_DIOPTERS: 43.00
OS_K1POWER_DIOPTERS: 43.50
OS_AXISANGLE_DEGREES: 165

## 2023-08-14 ASSESSMENT — CONFRONTATIONAL VISUAL FIELD TEST (CVF)
OS_FINDINGS: FULL
OD_FINDINGS: FULL

## 2023-08-14 ASSESSMENT — AXIALLENGTH_DERIVED
OD_AL: 23.5004
OS_AL: 23.7446

## 2023-09-08 ENCOUNTER — APPOINTMENT (OUTPATIENT)
Dept: ENDOCRINOLOGY | Facility: CLINIC | Age: 68
End: 2023-09-08
Payer: MEDICARE

## 2023-09-08 PROCEDURE — G0108 DIAB MANAGE TRN  PER INDIV: CPT

## 2023-09-28 ENCOUNTER — RX RENEWAL (OUTPATIENT)
Age: 68
End: 2023-09-28

## 2023-11-02 ENCOUNTER — APPOINTMENT (OUTPATIENT)
Dept: ENDOCRINOLOGY | Facility: CLINIC | Age: 68
End: 2023-11-02
Payer: MEDICARE

## 2023-11-02 PROCEDURE — G0108 DIAB MANAGE TRN  PER INDIV: CPT | Mod: GA

## 2023-11-02 PROCEDURE — G0108 DIAB MANAGE TRN  PER INDIV: CPT

## 2023-11-03 RX ORDER — SIMVASTATIN 40 MG/1
40 TABLET, FILM COATED ORAL
Qty: 90 | Refills: 3 | Status: ACTIVE | COMMUNITY
Start: 2022-11-16 | End: 1900-01-01

## 2023-11-13 ENCOUNTER — OFFICE (OUTPATIENT)
Dept: URBAN - METROPOLITAN AREA CLINIC 105 | Facility: CLINIC | Age: 68
Setting detail: OPHTHALMOLOGY
End: 2023-11-13
Payer: MEDICARE

## 2023-11-13 DIAGNOSIS — E10.3312: ICD-10-CM

## 2023-11-13 DIAGNOSIS — E10.3313: ICD-10-CM

## 2023-11-13 PROCEDURE — 92134 CPTRZ OPH DX IMG PST SGM RTA: CPT | Performed by: OPHTHALMOLOGY

## 2023-11-13 PROCEDURE — 67028 INJECTION EYE DRUG: CPT | Mod: LT | Performed by: OPHTHALMOLOGY

## 2023-11-13 ASSESSMENT — REFRACTION_CURRENTRX
OD_CYLINDER: -2.75
OD_ADD: +2.50
OS_AXIS: 083
OS_VPRISM_DIRECTION: PROGS
OS_SPHERE: +0.50
OS_ADD: +2.50
OS_OVR_VA: 20/
OS_CYLINDER: -0.75
OD_AXIS: 127
OD_OVR_VA: 20/
OD_VPRISM_DIRECTION: PROGS
OD_SPHERE: +2.00

## 2023-11-13 ASSESSMENT — REFRACTION_AUTOREFRACTION
OS_AXIS: 066
OD_SPHERE: +1.25
OD_AXIS: 118
OS_CYLINDER: -1.25
OD_CYLINDER: -2.50
OS_SPHERE: 0.00

## 2023-11-13 ASSESSMENT — SPHEQUIV_DERIVED
OD_SPHEQUIV: 0
OS_SPHEQUIV: -0.625

## 2023-11-13 ASSESSMENT — CONFRONTATIONAL VISUAL FIELD TEST (CVF)
OD_FINDINGS: FULL
OS_FINDINGS: FULL

## 2023-11-15 ENCOUNTER — OFFICE (OUTPATIENT)
Dept: URBAN - METROPOLITAN AREA CLINIC 105 | Facility: CLINIC | Age: 68
Setting detail: OPHTHALMOLOGY
End: 2023-11-15
Payer: MEDICARE

## 2023-11-15 DIAGNOSIS — E10.3311: ICD-10-CM

## 2023-11-15 PROCEDURE — 67210 TREATMENT OF RETINAL LESION: CPT | Mod: RT | Performed by: OPHTHALMOLOGY

## 2023-11-15 ASSESSMENT — REFRACTION_CURRENTRX
OD_VPRISM_DIRECTION: PROGS
OD_ADD: +2.50
OD_OVR_VA: 20/
OS_VPRISM_DIRECTION: PROGS
OD_CYLINDER: -2.75
OS_AXIS: 083
OS_SPHERE: +0.50
OS_OVR_VA: 20/
OS_ADD: +2.50
OD_SPHERE: +2.00
OD_AXIS: 127
OS_CYLINDER: -0.75

## 2023-11-15 ASSESSMENT — REFRACTION_AUTOREFRACTION
OS_SPHERE: 0.00
OD_AXIS: 118
OS_CYLINDER: -1.25
OS_AXIS: 066
OD_CYLINDER: -2.50
OD_SPHERE: +1.25

## 2023-11-15 ASSESSMENT — CONFRONTATIONAL VISUAL FIELD TEST (CVF)
OD_FINDINGS: FULL
OS_FINDINGS: FULL

## 2023-11-15 ASSESSMENT — SPHEQUIV_DERIVED
OS_SPHEQUIV: -0.625
OD_SPHEQUIV: 0

## 2023-12-05 ENCOUNTER — APPOINTMENT (OUTPATIENT)
Dept: ENDOCRINOLOGY | Facility: CLINIC | Age: 68
End: 2023-12-05

## 2023-12-19 ENCOUNTER — APPOINTMENT (OUTPATIENT)
Dept: ENDOCRINOLOGY | Facility: CLINIC | Age: 68
End: 2023-12-19
Payer: MEDICARE

## 2023-12-19 PROCEDURE — G0108 DIAB MANAGE TRN  PER INDIV: CPT | Mod: GA

## 2023-12-22 ENCOUNTER — OFFICE (OUTPATIENT)
Dept: URBAN - METROPOLITAN AREA CLINIC 105 | Facility: CLINIC | Age: 68
Setting detail: OPHTHALMOLOGY
End: 2023-12-22
Payer: MEDICARE

## 2023-12-22 DIAGNOSIS — E10.3312: ICD-10-CM

## 2023-12-22 DIAGNOSIS — E10.3311: ICD-10-CM

## 2023-12-22 DIAGNOSIS — E10.3313: ICD-10-CM

## 2023-12-22 PROCEDURE — 92134 CPTRZ OPH DX IMG PST SGM RTA: CPT | Performed by: OPHTHALMOLOGY

## 2023-12-22 PROCEDURE — 99024 POSTOP FOLLOW-UP VISIT: CPT | Performed by: OPHTHALMOLOGY

## 2023-12-22 PROCEDURE — 67210 TREATMENT OF RETINAL LESION: CPT | Mod: 79,LT | Performed by: OPHTHALMOLOGY

## 2023-12-22 ASSESSMENT — REFRACTION_CURRENTRX
OS_ADD: +2.50
OS_VPRISM_DIRECTION: PROGS
OS_CYLINDER: -0.75
OD_AXIS: 127
OD_SPHERE: +2.00
OS_SPHERE: +0.50
OD_OVR_VA: 20/
OD_ADD: +2.50
OS_OVR_VA: 20/
OS_AXIS: 083
OD_CYLINDER: -2.75
OD_VPRISM_DIRECTION: PROGS

## 2023-12-22 ASSESSMENT — SPHEQUIV_DERIVED
OD_SPHEQUIV: 0
OS_SPHEQUIV: -0.625

## 2023-12-22 ASSESSMENT — REFRACTION_AUTOREFRACTION
OD_AXIS: 118
OD_SPHERE: +1.25
OS_SPHERE: 0.00
OS_CYLINDER: -1.25
OD_CYLINDER: -2.50
OS_AXIS: 066

## 2024-02-06 LAB
HBA1C MFR BLD HPLC: 6.2
LDLC SERPL DIRECT ASSAY-MCNC: 130
MICROALBUMIN/CREAT 24H UR-RTO: 13
TSH SERPL-ACNC: 1.6

## 2024-02-08 ENCOUNTER — APPOINTMENT (OUTPATIENT)
Dept: ENDOCRINOLOGY | Facility: CLINIC | Age: 69
End: 2024-02-08
Payer: MEDICARE

## 2024-02-08 VITALS
DIASTOLIC BLOOD PRESSURE: 70 MMHG | WEIGHT: 140 LBS | BODY MASS INDEX: 20.04 KG/M2 | SYSTOLIC BLOOD PRESSURE: 110 MMHG | HEIGHT: 70 IN | HEART RATE: 77 BPM | OXYGEN SATURATION: 95 %

## 2024-02-08 PROCEDURE — G2211 COMPLEX E/M VISIT ADD ON: CPT

## 2024-02-08 PROCEDURE — 95251 CONT GLUC MNTR ANALYSIS I&R: CPT

## 2024-02-08 PROCEDURE — 99214 OFFICE O/P EST MOD 30 MIN: CPT

## 2024-02-08 RX ORDER — ENALAPRIL MALEATE 2.5 MG/1
2.5 TABLET ORAL
Refills: 0 | Status: ACTIVE | COMMUNITY

## 2024-02-08 NOTE — ASSESSMENT
[FreeTextEntry1] : DM type 1,extreme variability. Pt. is not amenable to recommendations for insulin use, encouraged reduction in dosing to avoid lows. He has been repeatedly counseled to not overuse insulin and understands this information. hyperlipidemia, on therapy.  hypertension stable

## 2024-02-08 NOTE — HISTORY OF PRESENT ILLNESS
[Continuous Glucose Monitoring] : Continuous Glucose Monitoring: Yes [[place]] : [place] [FreeTextEntry1] : DM type:1 Severity:severe Duration:45 years   Associated symptoms or complications:retinopathy, hypoglycemia unawareness had foot wound, healed  Current control: very variable  PMH: s/p subdural hematoma  Now back on N and R insulin - patient much happier with this regimen; syringes 18NPH, 6R 9NPH, 11R - or higher if glucose high  started jardiance and noted decreased urination in the afternoon  Past regimen: Toujeo 20 units - am Humalog pre-meal  		2 units 	150-199	3 	200-249	4 	250-299	5 	300+	6 plus 2,2,4  past - VGO 20 - stopped VGO due to hyperglycemia episode over 500 tresiba 20 (before VGO) and Humalog scale as above  [FreeTextEntry2] : 56 [FreeTextEntry3] : 21 [FreeTextEntry4] : 23 [de-identified] : 6.2 [FreeTextEntry5] : 131 [FreeTextEntry6] : 59

## 2024-03-20 ENCOUNTER — OFFICE (OUTPATIENT)
Dept: URBAN - METROPOLITAN AREA CLINIC 105 | Facility: CLINIC | Age: 69
Setting detail: OPHTHALMOLOGY
End: 2024-03-20
Payer: MEDICARE

## 2024-03-20 DIAGNOSIS — E10.3313: ICD-10-CM

## 2024-03-20 DIAGNOSIS — E10.3311: ICD-10-CM

## 2024-03-20 PROCEDURE — 67210 TREATMENT OF RETINAL LESION: CPT | Mod: 79,RT | Performed by: OPHTHALMOLOGY

## 2024-03-20 PROCEDURE — 92134 CPTRZ OPH DX IMG PST SGM RTA: CPT | Performed by: OPHTHALMOLOGY

## 2024-03-20 ASSESSMENT — REFRACTION_CURRENTRX
OS_OVR_VA: 20/
OD_AXIS: 127
OD_CYLINDER: -2.75
OS_CYLINDER: -0.75
OS_VPRISM_DIRECTION: PROGS
OS_ADD: +2.50
OD_OVR_VA: 20/
OS_AXIS: 083
OD_VPRISM_DIRECTION: PROGS
OD_ADD: +2.50
OD_SPHERE: +2.00
OS_SPHERE: +0.50

## 2024-04-07 ENCOUNTER — RX RENEWAL (OUTPATIENT)
Age: 69
End: 2024-04-07

## 2024-04-09 RX ORDER — GLUCAGON INJECTION, SOLUTION 1 MG/.2ML
1 INJECTION, SOLUTION SUBCUTANEOUS
Qty: 2 | Refills: 3 | Status: ACTIVE | COMMUNITY
Start: 2021-07-20 | End: 1900-01-01

## 2024-04-17 ENCOUNTER — OFFICE (OUTPATIENT)
Dept: URBAN - METROPOLITAN AREA CLINIC 105 | Facility: CLINIC | Age: 69
Setting detail: OPHTHALMOLOGY
End: 2024-04-17
Payer: MEDICARE

## 2024-04-17 DIAGNOSIS — E10.3312: ICD-10-CM

## 2024-04-17 PROCEDURE — 67210 TREATMENT OF RETINAL LESION: CPT | Mod: 79,LT | Performed by: OPHTHALMOLOGY

## 2024-05-07 ENCOUNTER — APPOINTMENT (OUTPATIENT)
Dept: ENDOCRINOLOGY | Facility: CLINIC | Age: 69
End: 2024-05-07
Payer: MEDICARE

## 2024-05-07 DIAGNOSIS — E10.65 TYPE 1 DIABETES MELLITUS WITH HYPERGLYCEMIA: ICD-10-CM

## 2024-05-07 PROCEDURE — G0108 DIAB MANAGE TRN  PER INDIV: CPT | Mod: GA

## 2024-05-08 RX ORDER — BLOOD SUGAR DIAGNOSTIC
STRIP MISCELLANEOUS
Qty: 300 | Refills: 1 | Status: ACTIVE | COMMUNITY
Start: 2022-07-27 | End: 1900-01-01

## 2024-05-21 ENCOUNTER — OFFICE (OUTPATIENT)
Dept: URBAN - METROPOLITAN AREA CLINIC 113 | Facility: CLINIC | Age: 69
Setting detail: OPHTHALMOLOGY
End: 2024-05-21
Payer: MEDICARE

## 2024-05-21 DIAGNOSIS — H52.4: ICD-10-CM

## 2024-05-21 DIAGNOSIS — H40.033: ICD-10-CM

## 2024-05-21 DIAGNOSIS — H52.223: ICD-10-CM

## 2024-05-21 PROCEDURE — 99213 OFFICE O/P EST LOW 20 MIN: CPT | Mod: 24 | Performed by: OPTOMETRIST

## 2024-05-21 PROCEDURE — 92015 DETERMINE REFRACTIVE STATE: CPT | Performed by: OPTOMETRIST

## 2024-05-21 ASSESSMENT — CONFRONTATIONAL VISUAL FIELD TEST (CVF)
OD_FINDINGS: FULL
OS_FINDINGS: FULL

## 2024-06-21 ENCOUNTER — RX RENEWAL (OUTPATIENT)
Age: 69
End: 2024-06-21

## 2024-06-21 RX ORDER — INSULIN HUMAN 100 [IU]/ML
100 INJECTION, SOLUTION PARENTERAL
Qty: 40 | Refills: 5 | Status: ACTIVE | COMMUNITY
Start: 2023-07-17 | End: 1900-01-01

## 2024-06-21 RX ORDER — INSULIN HUMAN 100 [IU]/ML
100 INJECTION, SUSPENSION SUBCUTANEOUS
Qty: 40 | Refills: 5 | Status: ACTIVE | COMMUNITY
Start: 2023-07-17 | End: 1900-01-01

## 2024-07-22 ENCOUNTER — OFFICE (OUTPATIENT)
Dept: URBAN - METROPOLITAN AREA CLINIC 105 | Facility: CLINIC | Age: 69
Setting detail: OPHTHALMOLOGY
End: 2024-07-22
Payer: MEDICARE

## 2024-07-22 DIAGNOSIS — E10.3313: ICD-10-CM

## 2024-07-22 DIAGNOSIS — E10.3312: ICD-10-CM

## 2024-07-22 PROCEDURE — 92235 FLUORESCEIN ANGRPH MLTIFRAME: CPT | Performed by: OPHTHALMOLOGY

## 2024-07-22 PROCEDURE — 67028 INJECTION EYE DRUG: CPT | Mod: LT | Performed by: OPHTHALMOLOGY

## 2024-07-22 PROCEDURE — 92134 CPTRZ OPH DX IMG PST SGM RTA: CPT | Performed by: OPHTHALMOLOGY

## 2024-07-22 ASSESSMENT — CONFRONTATIONAL VISUAL FIELD TEST (CVF)
OD_FINDINGS: FULL
OS_FINDINGS: FULL

## 2024-08-13 ENCOUNTER — RX RENEWAL (OUTPATIENT)
Age: 69
End: 2024-08-13

## 2024-08-13 DIAGNOSIS — E10.65 TYPE 1 DIABETES MELLITUS WITH HYPERGLYCEMIA: ICD-10-CM

## 2024-08-23 ENCOUNTER — OFFICE (OUTPATIENT)
Dept: URBAN - METROPOLITAN AREA CLINIC 105 | Facility: CLINIC | Age: 69
Setting detail: OPHTHALMOLOGY
End: 2024-08-23
Payer: MEDICARE

## 2024-08-23 DIAGNOSIS — E10.3311: ICD-10-CM

## 2024-08-23 LAB
HBA1C MFR BLD HPLC: 6.6
LDLC SERPL DIRECT ASSAY-MCNC: 79
MICROALBUMIN/CREAT 24H UR-RTO: 20
TSH SERPL-ACNC: 1

## 2024-08-23 PROCEDURE — 67210 TREATMENT OF RETINAL LESION: CPT | Mod: RT | Performed by: OPHTHALMOLOGY

## 2024-08-23 ASSESSMENT — CONFRONTATIONAL VISUAL FIELD TEST (CVF)
OS_FINDINGS: FULL
OD_FINDINGS: FULL

## 2024-08-29 ENCOUNTER — APPOINTMENT (OUTPATIENT)
Dept: ENDOCRINOLOGY | Facility: CLINIC | Age: 69
End: 2024-08-29
Payer: MEDICARE

## 2024-08-29 VITALS
HEIGHT: 70 IN | OXYGEN SATURATION: 94 % | WEIGHT: 126 LBS | HEART RATE: 83 BPM | SYSTOLIC BLOOD PRESSURE: 120 MMHG | DIASTOLIC BLOOD PRESSURE: 72 MMHG | BODY MASS INDEX: 18.04 KG/M2

## 2024-08-29 DIAGNOSIS — E10.65 TYPE 1 DIABETES MELLITUS WITH HYPERGLYCEMIA: ICD-10-CM

## 2024-08-29 PROCEDURE — 99214 OFFICE O/P EST MOD 30 MIN: CPT

## 2024-08-29 PROCEDURE — G2211 COMPLEX E/M VISIT ADD ON: CPT

## 2024-08-29 PROCEDURE — 95251 CONT GLUC MNTR ANALYSIS I&R: CPT

## 2024-08-29 NOTE — ASSESSMENT
[FreeTextEntry1] : DM type 1,extreme variability. excess hypoglycemia. Remains on bid NPH/R, and lack of regular meals increases variability further. decrease [id number] to 16N/6R, and 14-18N/12-16 R to 12-16 N 10-12 R hyperlipidemia, on therapy.  hypertension stable

## 2024-08-29 NOTE — HISTORY OF PRESENT ILLNESS
[Continuous Glucose Monitoring] : Continuous Glucose Monitoring: Yes [[place]] : [place] [FreeTextEntry1] : DM type:1 Severity:severe Duration:45 years   Associated symptoms or complications:retinopathy, hypoglycemia unawareness had foot wound, healed  Current control: very variable  PMH: s/p subdural hematoma  Now back on N and R insulin - patient much happier with this regimen; syringes 18NPH, 6R 9NPH, 11R - or higher if glucose high  started jardiance and noted decreased urination in the afternoon  Past regimen: Toujeo 20 units - am Humalog pre-meal  		2 units 	150-199	3 	200-249	4 	250-299	5 	300+	6 plus 2,2,4  past - VGO 20 - stopped VGO due to hyperglycemia episode over 500 tresiba 20 (before VGO) and Humalog scale as above  [FreeTextEntry2] : 54 [FreeTextEntry3] : 22 [FreeTextEntry4] : 24 [de-identified] : 6.3 [FreeTextEntry5] : 126 [FreeTextEntry6] : 54

## 2024-09-10 ENCOUNTER — APPOINTMENT (OUTPATIENT)
Dept: ENDOCRINOLOGY | Facility: CLINIC | Age: 69
End: 2024-09-10
Payer: MEDICARE

## 2024-09-10 DIAGNOSIS — E10.65 TYPE 1 DIABETES MELLITUS WITH HYPERGLYCEMIA: ICD-10-CM

## 2024-09-10 PROCEDURE — G0108 DIAB MANAGE TRN  PER INDIV: CPT

## 2024-09-13 ENCOUNTER — OFFICE (OUTPATIENT)
Dept: URBAN - METROPOLITAN AREA CLINIC 105 | Facility: CLINIC | Age: 69
Setting detail: OPHTHALMOLOGY
End: 2024-09-13
Payer: MEDICARE

## 2024-09-13 DIAGNOSIS — E10.3312: ICD-10-CM

## 2024-09-13 PROCEDURE — 67210 TREATMENT OF RETINAL LESION: CPT | Mod: 79,LT | Performed by: OPHTHALMOLOGY

## 2024-09-13 ASSESSMENT — CONFRONTATIONAL VISUAL FIELD TEST (CVF)
OS_FINDINGS: FULL
OD_FINDINGS: FULL

## 2024-11-08 ENCOUNTER — OFFICE (OUTPATIENT)
Dept: URBAN - METROPOLITAN AREA CLINIC 105 | Facility: CLINIC | Age: 69
Setting detail: OPHTHALMOLOGY
End: 2024-11-08
Payer: MEDICARE

## 2024-11-08 DIAGNOSIS — E10.3313: ICD-10-CM

## 2024-11-08 DIAGNOSIS — E10.3312: ICD-10-CM

## 2024-11-08 PROCEDURE — 92134 CPTRZ OPH DX IMG PST SGM RTA: CPT | Performed by: OPHTHALMOLOGY

## 2024-11-08 PROCEDURE — 67028 INJECTION EYE DRUG: CPT | Mod: 58,LT | Performed by: OPHTHALMOLOGY

## 2024-11-08 ASSESSMENT — REFRACTION_CURRENTRX
OS_AXIS: 075
OD_ADD: +2.50
OD_AXIS: 113
OS_OVR_VA: 20/
OS_CYLINDER: -1.00
OD_OVR_VA: 20/
OS_SPHERE: +0.25
OD_CYLINDER: -1.25
OS_VPRISM_DIRECTION: PROGS
OD_VPRISM_DIRECTION: PROGS
OS_ADD: +2.50
OD_SPHERE: +0.75

## 2024-11-08 ASSESSMENT — REFRACTION_AUTOREFRACTION
OS_SPHERE: +0.50
OD_CYLINDER: -2.00
OD_AXIS: 130
OS_CYLINDER: 0-1.50
OS_AXIS: 082
OD_SPHERE: +1.00

## 2024-11-08 ASSESSMENT — KERATOMETRY
OS_K1POWER_DIOPTERS: 43.00
OD_AXISANGLE_DEGREES: 012
OD_K2POWER_DIOPTERS: 44.50
OD_K1POWER_DIOPTERS: 42.75
OS_K2POWER_DIOPTERS: 44.00
OS_AXISANGLE_DEGREES: 176

## 2024-11-08 ASSESSMENT — VISUAL ACUITY
OS_BCVA: 20/60-
OD_BCVA: 20/80+

## 2024-11-08 ASSESSMENT — REFRACTION_MANIFEST
OD_AXIS: 110
OS_CYLINDER: -1.00
OD_SPHERE: +1.25
OD_ADD: +3.00
OD_CYLINDER: -1.75
OD_VA1: 20/30+
OS_VA1: 20/50
OS_ADD: +3.00
OS_AXIS: 075
OS_SPHERE: PLANO

## 2024-11-08 ASSESSMENT — TONOMETRY: OD_IOP_MMHG: 19

## 2024-11-08 ASSESSMENT — CONFRONTATIONAL VISUAL FIELD TEST (CVF)
OS_FINDINGS: FULL
OD_FINDINGS: FULL

## 2024-12-10 ENCOUNTER — APPOINTMENT (OUTPATIENT)
Dept: ENDOCRINOLOGY | Facility: CLINIC | Age: 69
End: 2024-12-10
Payer: MEDICARE

## 2024-12-10 DIAGNOSIS — E10.65 TYPE 1 DIABETES MELLITUS WITH HYPERGLYCEMIA: ICD-10-CM

## 2024-12-10 PROCEDURE — G0108 DIAB MANAGE TRN  PER INDIV: CPT | Mod: GA

## 2024-12-23 ENCOUNTER — OFFICE (OUTPATIENT)
Dept: URBAN - METROPOLITAN AREA CLINIC 103 | Facility: CLINIC | Age: 69
Setting detail: OPHTHALMOLOGY
End: 2024-12-23
Payer: MEDICARE

## 2024-12-23 DIAGNOSIS — E10.3311: ICD-10-CM

## 2024-12-23 PROCEDURE — 67210 TREATMENT OF RETINAL LESION: CPT | Mod: RT | Performed by: OPHTHALMOLOGY

## 2024-12-23 PROCEDURE — 92134 CPTRZ OPH DX IMG PST SGM RTA: CPT | Performed by: OPHTHALMOLOGY

## 2024-12-23 ASSESSMENT — CONFRONTATIONAL VISUAL FIELD TEST (CVF)
OS_FINDINGS: FULL
OD_FINDINGS: FULL

## 2024-12-23 ASSESSMENT — REFRACTION_AUTOREFRACTION
OD_SPHERE: +1.00
OS_AXIS: 082
OS_SPHERE: +0.50
OD_AXIS: 130
OS_CYLINDER: 0-1.50
OD_CYLINDER: -2.00

## 2024-12-23 ASSESSMENT — REFRACTION_CURRENTRX
OS_ADD: +2.50
OS_CYLINDER: -1.00
OS_SPHERE: +0.25
OS_OVR_VA: 20/
OS_AXIS: 075
OD_ADD: +2.50
OS_VPRISM_DIRECTION: PROGS
OD_OVR_VA: 20/
OD_AXIS: 113
OD_SPHERE: +0.75
OD_CYLINDER: -1.25
OD_VPRISM_DIRECTION: PROGS

## 2024-12-23 ASSESSMENT — VISUAL ACUITY
OD_BCVA: 20/100
OS_BCVA: 20/50

## 2024-12-23 ASSESSMENT — KERATOMETRY
OS_K2POWER_DIOPTERS: 44.00
OS_K1POWER_DIOPTERS: 43.00
OD_K2POWER_DIOPTERS: 44.50
OD_K1POWER_DIOPTERS: 42.75
OD_AXISANGLE_DEGREES: 012
OS_AXISANGLE_DEGREES: 176

## 2024-12-23 ASSESSMENT — TONOMETRY: OD_IOP_MMHG: 19

## 2024-12-27 ENCOUNTER — RX RENEWAL (OUTPATIENT)
Age: 69
End: 2024-12-27

## 2025-01-10 ENCOUNTER — OFFICE (OUTPATIENT)
Dept: URBAN - METROPOLITAN AREA CLINIC 105 | Facility: CLINIC | Age: 70
Setting detail: OPHTHALMOLOGY
End: 2025-01-10
Payer: MEDICARE

## 2025-01-10 DIAGNOSIS — E10.3312: ICD-10-CM

## 2025-01-10 PROCEDURE — 67210 TREATMENT OF RETINAL LESION: CPT | Mod: 79,LT | Performed by: OPHTHALMOLOGY

## 2025-01-10 ASSESSMENT — KERATOMETRY
OD_K2POWER_DIOPTERS: 44.50
OD_AXISANGLE_DEGREES: 012
OS_K2POWER_DIOPTERS: 44.00
OS_K1POWER_DIOPTERS: 43.00
OD_K1POWER_DIOPTERS: 42.75
OS_AXISANGLE_DEGREES: 176

## 2025-01-10 ASSESSMENT — TONOMETRY
OS_IOP_MMHG: 21
OD_IOP_MMHG: 20

## 2025-01-10 ASSESSMENT — REFRACTION_CURRENTRX
OD_AXIS: 113
OD_CYLINDER: -1.25
OS_AXIS: 075
OS_SPHERE: +0.25
OD_OVR_VA: 20/
OS_CYLINDER: -1.00
OD_ADD: +2.50
OS_OVR_VA: 20/
OD_SPHERE: +0.75
OS_ADD: +2.50
OS_VPRISM_DIRECTION: PROGS
OD_VPRISM_DIRECTION: PROGS

## 2025-01-10 ASSESSMENT — REFRACTION_AUTOREFRACTION
OD_AXIS: 130
OS_AXIS: 082
OD_CYLINDER: -2.00
OD_SPHERE: +1.00
OS_SPHERE: +0.50
OS_CYLINDER: 0-1.50

## 2025-01-10 ASSESSMENT — VISUAL ACUITY
OD_BCVA: 20/100
OS_BCVA: 20/50-2

## 2025-01-10 ASSESSMENT — CONFRONTATIONAL VISUAL FIELD TEST (CVF)
OS_FINDINGS: FULL
OD_FINDINGS: FULL

## 2025-01-14 ENCOUNTER — OFFICE (OUTPATIENT)
Dept: URBAN - METROPOLITAN AREA CLINIC 103 | Facility: CLINIC | Age: 70
Setting detail: OPHTHALMOLOGY
End: 2025-01-14
Payer: MEDICARE

## 2025-01-14 DIAGNOSIS — H25.13: ICD-10-CM

## 2025-01-14 DIAGNOSIS — H25.12: ICD-10-CM

## 2025-01-14 PROBLEM — H25.013 CORTICAL CATARACT; BOTH EYES: Status: ACTIVE | Noted: 2025-01-14

## 2025-01-14 PROBLEM — H25.11 CATARACT NUCLEAR SCLEROSIS AGE RELATED; RIGHT EYE, LEFT EYE, BOTH EYES: Status: ACTIVE | Noted: 2025-01-14

## 2025-01-14 PROCEDURE — 99213 OFFICE O/P EST LOW 20 MIN: CPT | Performed by: OPHTHALMOLOGY

## 2025-01-14 PROCEDURE — 92136 OPHTHALMIC BIOMETRY: CPT | Mod: TC | Performed by: OPHTHALMOLOGY

## 2025-01-14 PROCEDURE — 92136 OPHTHALMIC BIOMETRY: CPT | Mod: 26,LT | Performed by: OPHTHALMOLOGY

## 2025-01-14 ASSESSMENT — REFRACTION_AUTOREFRACTION
OD_AXIS: 106
OS_SPHERE: +0.25
OS_AXIS: 091
OD_CYLINDER: -0.25
OD_SPHERE: +1.75
OS_CYLINDER: -1.00

## 2025-01-14 ASSESSMENT — VISUAL ACUITY
OD_BCVA: 20/100-1
OS_BCVA: 20/50-1

## 2025-01-14 ASSESSMENT — REFRACTION_CURRENTRX
OS_OVR_VA: 20/
OS_SPHERE: PL
OS_ADD: +3.00
OD_OVR_VA: 20/
OD_VPRISM_DIRECTION: PROGS
OD_AXIS: 104
OS_CYLINDER: -1.00
OD_CYLINDER: -1.50
OD_ADD: +3.00
OS_VPRISM_DIRECTION: PROGS
OD_SPHERE: +1.25
OS_AXIS: 074

## 2025-01-14 ASSESSMENT — KERATOMETRY
OD_AXISANGLE2_DEGREES: 014
OS_K1POWER_DIOPTERS: 43.00
OD_AXISANGLE_DEGREES: 014
OS_CYLAXISANGLE_DEGREES: 171
OS_K1K2_AVERAGE: 43.375
OS_K2POWER_DIOPTERS: 43.75
OS_CYLPOWER_DEGREES: 0.75
OD_AXISANGLE_DEGREES: 104
OD_CYLAXISANGLE_DEGREES: 014
OD_K2POWER_DIOPTERS: 44.25
OS_AXISANGLE2_DEGREES: 171
OS_K2POWER_DIOPTERS: 43.75
OD_K1POWER_DIOPTERS: 42.50
OD_K2POWER_DIOPTERS: 44.25
OS_K1POWER_DIOPTERS: 43.00
OD_CYLPOWER_DEGREES: 1.75
OD_K1K2_AVERAGE: 43.375
OS_AXISANGLE_DEGREES: 81
OS_AXISANGLE_DEGREES: 171
OD_K1POWER_DIOPTERS: 42.50

## 2025-01-14 ASSESSMENT — TONOMETRY
OS_IOP_MMHG: 20
OD_IOP_MMHG: 20

## 2025-01-14 ASSESSMENT — CONFRONTATIONAL VISUAL FIELD TEST (CVF)
OS_FINDINGS: FULL
OD_FINDINGS: FULL

## 2025-01-20 ENCOUNTER — APPOINTMENT (OUTPATIENT)
Dept: ENDOCRINOLOGY | Facility: CLINIC | Age: 70
End: 2025-01-20

## 2025-02-10 ENCOUNTER — OFFICE (OUTPATIENT)
Dept: URBAN - METROPOLITAN AREA CLINIC 105 | Facility: CLINIC | Age: 70
Setting detail: OPHTHALMOLOGY
End: 2025-02-10
Payer: MEDICARE

## 2025-02-10 DIAGNOSIS — E10.3313: ICD-10-CM

## 2025-02-10 PROCEDURE — 67028 INJECTION EYE DRUG: CPT | Mod: 58,50 | Performed by: OPHTHALMOLOGY

## 2025-02-10 PROCEDURE — 92134 CPTRZ OPH DX IMG PST SGM RTA: CPT | Performed by: OPHTHALMOLOGY

## 2025-02-10 PROCEDURE — 99024 POSTOP FOLLOW-UP VISIT: CPT | Performed by: OPHTHALMOLOGY

## 2025-02-10 ASSESSMENT — REFRACTION_AUTOREFRACTION
OS_AXIS: 091
OS_SPHERE: +0.25
OD_AXIS: 106
OS_CYLINDER: -1.00
OD_CYLINDER: -0.25
OD_SPHERE: +1.75

## 2025-02-10 ASSESSMENT — REFRACTION_CURRENTRX
OD_CYLINDER: -1.50
OD_ADD: +3.00
OS_VPRISM_DIRECTION: PROGS
OS_ADD: +3.00
OD_VPRISM_DIRECTION: PROGS
OS_SPHERE: PL
OS_AXIS: 074
OD_OVR_VA: 20/
OD_AXIS: 104
OS_CYLINDER: -1.00
OS_OVR_VA: 20/
OD_SPHERE: +1.25

## 2025-02-10 ASSESSMENT — KERATOMETRY
OD_AXISANGLE_DEGREES: 014
OD_K2POWER_DIOPTERS: 44.25
OD_K1POWER_DIOPTERS: 42.50
OS_K1POWER_DIOPTERS: 43.00
OS_AXISANGLE_DEGREES: 171
OS_K2POWER_DIOPTERS: 43.75

## 2025-02-10 ASSESSMENT — VISUAL ACUITY
OS_BCVA: 20/60-
OD_BCVA: 20/100

## 2025-02-10 ASSESSMENT — CONFRONTATIONAL VISUAL FIELD TEST (CVF)
OD_FINDINGS: FULL
OS_FINDINGS: FULL

## 2025-02-10 ASSESSMENT — TONOMETRY
OD_IOP_MMHG: 17
OS_IOP_MMHG: 18

## 2025-02-11 ENCOUNTER — APPOINTMENT (OUTPATIENT)
Dept: ENDOCRINOLOGY | Facility: CLINIC | Age: 70
End: 2025-02-11
Payer: MEDICARE

## 2025-02-11 PROCEDURE — G0108 DIAB MANAGE TRN  PER INDIV: CPT | Mod: GA

## 2025-02-14 LAB
HBA1C MFR BLD HPLC: 6.3
LDLC SERPL DIRECT ASSAY-MCNC: 109
MICROALBUMIN/CREAT 24H UR-RTO: 24
TSH SERPL-ACNC: 0.96

## 2025-02-20 ENCOUNTER — APPOINTMENT (OUTPATIENT)
Dept: ENDOCRINOLOGY | Facility: CLINIC | Age: 70
End: 2025-02-20
Payer: MEDICARE

## 2025-02-20 ENCOUNTER — NON-APPOINTMENT (OUTPATIENT)
Age: 70
End: 2025-02-20

## 2025-02-20 VITALS
OXYGEN SATURATION: 98 % | SYSTOLIC BLOOD PRESSURE: 130 MMHG | HEART RATE: 87 BPM | BODY MASS INDEX: 18.61 KG/M2 | HEIGHT: 70 IN | DIASTOLIC BLOOD PRESSURE: 72 MMHG | WEIGHT: 130 LBS

## 2025-02-20 DIAGNOSIS — E10.65 TYPE 1 DIABETES MELLITUS WITH HYPERGLYCEMIA: ICD-10-CM

## 2025-02-20 PROCEDURE — G2211 COMPLEX E/M VISIT ADD ON: CPT

## 2025-02-20 PROCEDURE — 99214 OFFICE O/P EST MOD 30 MIN: CPT

## 2025-02-20 PROCEDURE — 95251 CONT GLUC MNTR ANALYSIS I&R: CPT

## 2025-02-24 ENCOUNTER — RX ONLY (RX ONLY)
Age: 70
End: 2025-02-24

## 2025-02-24 ENCOUNTER — AMBULATORY SURGERY CENTER (OUTPATIENT)
Dept: URBAN - METROPOLITAN AREA SURGERY 4 | Facility: SURGERY | Age: 70
Setting detail: OPHTHALMOLOGY
End: 2025-02-24
Payer: MEDICARE

## 2025-02-24 DIAGNOSIS — H25.12: ICD-10-CM

## 2025-02-24 PROCEDURE — 66984 XCAPSL CTRC RMVL W/O ECP: CPT | Mod: 79,LT | Performed by: OPHTHALMOLOGY

## 2025-02-24 PROCEDURE — 68841 INSJ RX ELUT IMPLT LAC CANAL: CPT | Mod: 79,LT | Performed by: OPHTHALMOLOGY

## 2025-02-25 ENCOUNTER — OFFICE (OUTPATIENT)
Dept: URBAN - METROPOLITAN AREA CLINIC 103 | Facility: CLINIC | Age: 70
Setting detail: OPHTHALMOLOGY
End: 2025-02-25
Payer: MEDICARE

## 2025-02-25 DIAGNOSIS — Z96.1: ICD-10-CM

## 2025-02-25 PROCEDURE — 99024 POSTOP FOLLOW-UP VISIT: CPT | Performed by: OPTOMETRIST

## 2025-02-25 ASSESSMENT — REFRACTION_CURRENTRX
OD_AXIS: 104
OS_AXIS: 074
OS_VPRISM_DIRECTION: PROGS
OS_CYLINDER: -1.00
OD_SPHERE: +1.25
OD_CYLINDER: -1.50
OS_SPHERE: PL
OS_ADD: +3.00
OD_VPRISM_DIRECTION: PROGS
OD_ADD: +3.00
OD_OVR_VA: 20/
OS_OVR_VA: 20/

## 2025-02-25 ASSESSMENT — KERATOMETRY
OS_K1POWER_DIOPTERS: 42.50
OS_AXISANGLE_DEGREES: 149
OD_K2POWER_DIOPTERS: 44.50
OS_K2POWER_DIOPTERS: 44.25
OD_AXISANGLE_DEGREES: 011
OD_K1POWER_DIOPTERS: 43.00

## 2025-02-25 ASSESSMENT — VISUAL ACUITY
OD_BCVA: 20/100-1
OS_BCVA: 20/60

## 2025-02-25 ASSESSMENT — TONOMETRY
OS_IOP_MMHG: 19
OD_IOP_MMHG: 18

## 2025-02-25 ASSESSMENT — REFRACTION_AUTOREFRACTION
OD_CYLINDER: -1.50
OS_CYLINDER: --1.75
OD_AXIS: 101
OS_SPHERE: UTP
OD_SPHERE: UTP
OS_AXIS: 059

## 2025-03-05 ENCOUNTER — OFFICE (OUTPATIENT)
Dept: URBAN - METROPOLITAN AREA CLINIC 103 | Facility: CLINIC | Age: 70
Setting detail: OPHTHALMOLOGY
End: 2025-03-05
Payer: MEDICARE

## 2025-03-05 DIAGNOSIS — H25.11: ICD-10-CM

## 2025-03-05 PROCEDURE — 92136 OPHTHALMIC BIOMETRY: CPT | Mod: 26,RT | Performed by: OPHTHALMOLOGY

## 2025-03-05 ASSESSMENT — KERATOMETRY
OS_K1POWER_DIOPTERS: 43.25
OS_K2POWER_DIOPTERS: 44.00
OD_AXISANGLE_DEGREES: 013
OD_K2POWER_DIOPTERS: 44.25
OD_K1POWER_DIOPTERS: 42.75
OS_AXISANGLE_DEGREES: 163

## 2025-03-05 ASSESSMENT — REFRACTION_CURRENTRX
OD_VPRISM_DIRECTION: PROGS
OS_VPRISM_DIRECTION: PROGS
OS_ADD: +3.00
OS_OVR_VA: 20/
OD_OVR_VA: 20/
OS_CYLINDER: -1.00
OD_CYLINDER: -1.50
OD_AXIS: 104
OD_SPHERE: +1.25
OD_ADD: +3.00
OS_SPHERE: PL
OS_AXIS: 074

## 2025-03-05 ASSESSMENT — VISUAL ACUITY
OS_BCVA: 20/70-1
OD_BCVA: 20/40+1

## 2025-03-05 ASSESSMENT — TONOMETRY
OS_IOP_MMHG: 13
OD_IOP_MMHG: 16

## 2025-03-05 ASSESSMENT — REFRACTION_AUTOREFRACTION
OD_CYLINDER: -1.50
OS_SPHERE: +0.50
OD_AXIS: 101
OD_SPHERE: UTP
OS_CYLINDER: -1.25
OS_AXIS: 073

## 2025-03-05 ASSESSMENT — CONFRONTATIONAL VISUAL FIELD TEST (CVF)
OD_FINDINGS: FULL
OS_FINDINGS: FULL

## 2025-03-10 ENCOUNTER — AMBULATORY SURGERY CENTER (OUTPATIENT)
Dept: URBAN - METROPOLITAN AREA SURGERY 4 | Facility: SURGERY | Age: 70
Setting detail: OPHTHALMOLOGY
End: 2025-03-10
Payer: MEDICARE

## 2025-03-10 DIAGNOSIS — H25.11: ICD-10-CM

## 2025-03-10 PROCEDURE — 66984 XCAPSL CTRC RMVL W/O ECP: CPT | Mod: 79,RT | Performed by: OPHTHALMOLOGY

## 2025-03-10 PROCEDURE — 68841 INSJ RX ELUT IMPLT LAC CANAL: CPT | Mod: 79,RT | Performed by: OPHTHALMOLOGY

## 2025-03-11 ENCOUNTER — OFFICE (OUTPATIENT)
Dept: URBAN - METROPOLITAN AREA CLINIC 103 | Facility: CLINIC | Age: 70
Setting detail: OPHTHALMOLOGY
End: 2025-03-11
Payer: MEDICARE

## 2025-03-11 ENCOUNTER — RX ONLY (RX ONLY)
Age: 70
End: 2025-03-11

## 2025-03-11 DIAGNOSIS — Z96.1: ICD-10-CM

## 2025-03-11 PROBLEM — H25.011 CORTICAL CATARACT; RIGHT EYE: Status: RESOLVED | Noted: 2025-03-05 | Resolved: 2025-03-11

## 2025-03-11 PROCEDURE — 99024 POSTOP FOLLOW-UP VISIT: CPT | Performed by: OPTOMETRIST

## 2025-03-11 ASSESSMENT — REFRACTION_CURRENTRX
OS_CYLINDER: -1.00
OD_VPRISM_DIRECTION: PROGS
OD_CYLINDER: -1.50
OS_AXIS: 074
OD_ADD: +3.00
OS_SPHERE: PL
OD_AXIS: 104
OS_ADD: +3.00
OS_OVR_VA: 20/
OD_SPHERE: +1.25
OD_OVR_VA: 20/
OS_VPRISM_DIRECTION: PROGS

## 2025-03-11 ASSESSMENT — REFRACTION_AUTOREFRACTION
OD_SPHERE: 0.00
OD_CYLINDER: -1.75
OS_AXIS: 073
OS_SPHERE: 0.00
OD_AXIS: 099
OS_CYLINDER: -1.00

## 2025-03-11 ASSESSMENT — CORNEAL EDEMA CLINICAL DESCRIPTION: OD_CORNEALEDEMA: 3+

## 2025-03-11 ASSESSMENT — VISUAL ACUITY
OD_BCVA: 20/30-2
OS_BCVA: 20/100-1

## 2025-03-11 ASSESSMENT — KERATOMETRY
OS_K1POWER_DIOPTERS: 43.00
OD_K2POWER_DIOPTERS: 44.50
OD_AXISANGLE_DEGREES: 009
OS_AXISANGLE_DEGREES: 163
OS_K2POWER_DIOPTERS: 44.00
OD_K1POWER_DIOPTERS: 42.75

## 2025-03-11 ASSESSMENT — TONOMETRY
OD_IOP_MMHG: 20
OS_IOP_MMHG: 18

## 2025-03-18 ENCOUNTER — OFFICE (OUTPATIENT)
Dept: URBAN - METROPOLITAN AREA CLINIC 103 | Facility: CLINIC | Age: 70
Setting detail: OPHTHALMOLOGY
End: 2025-03-18
Payer: MEDICARE

## 2025-03-18 DIAGNOSIS — Z96.1: ICD-10-CM

## 2025-03-18 PROCEDURE — 99024 POSTOP FOLLOW-UP VISIT: CPT | Performed by: OPTOMETRIST

## 2025-03-18 ASSESSMENT — REFRACTION_AUTOREFRACTION
OS_SPHERE: +0.50
OS_CYLINDER: -1.50
OD_AXIS: 109
OS_AXIS: 084
OD_SPHERE: +0.75
OD_CYLINDER: -1.50

## 2025-03-18 ASSESSMENT — REFRACTION_CURRENTRX
OS_ADD: +3.00
OS_AXIS: 074
OS_OVR_VA: 20/
OD_ADD: +3.00
OS_VPRISM_DIRECTION: PROGS
OD_OVR_VA: 20/
OS_SPHERE: PL
OD_AXIS: 104
OD_VPRISM_DIRECTION: PROGS
OD_CYLINDER: -1.50
OS_CYLINDER: -1.00
OD_SPHERE: +1.25

## 2025-03-18 ASSESSMENT — TONOMETRY
OS_IOP_MMHG: 14
OD_IOP_MMHG: 15

## 2025-03-18 ASSESSMENT — VISUAL ACUITY
OD_BCVA: 20/40-
OS_BCVA: 20/25

## 2025-03-18 ASSESSMENT — KERATOMETRY
OS_AXISANGLE_DEGREES: 165
OD_K2POWER_DIOPTERS: 44.00
OD_AXISANGLE_DEGREES: 017
OD_K1POWER_DIOPTERS: 42.25
OS_K1POWER_DIOPTERS: 43.25
OS_K2POWER_DIOPTERS: 44.00

## 2025-03-18 ASSESSMENT — CONFRONTATIONAL VISUAL FIELD TEST (CVF)
OS_FINDINGS: FULL
OD_FINDINGS: FULL

## 2025-03-18 ASSESSMENT — CORNEAL EDEMA CLINICAL DESCRIPTION: OD_CORNEALEDEMA: ABSENT

## 2025-04-08 ENCOUNTER — OFFICE (OUTPATIENT)
Dept: URBAN - METROPOLITAN AREA CLINIC 103 | Facility: CLINIC | Age: 70
Setting detail: OPHTHALMOLOGY
End: 2025-04-08
Payer: MEDICARE

## 2025-04-08 DIAGNOSIS — H52.4: ICD-10-CM

## 2025-04-08 DIAGNOSIS — Z96.1: ICD-10-CM

## 2025-04-08 PROCEDURE — 99024 POSTOP FOLLOW-UP VISIT: CPT | Performed by: OPTOMETRIST

## 2025-04-08 ASSESSMENT — REFRACTION_CURRENTRX
OD_CYLINDER: -1.50
OD_ADD: +3.00
OD_VPRISM_DIRECTION: PROGS
OS_ADD: +3.00
OS_VPRISM_DIRECTION: PROGS
OD_OVR_VA: 20/
OS_OVR_VA: 20/
OS_SPHERE: PL
OS_CYLINDER: -1.00
OD_SPHERE: +1.25
OS_AXIS: 074
OD_AXIS: 104

## 2025-04-08 ASSESSMENT — TONOMETRY
OS_IOP_MMHG: 17
OD_IOP_MMHG: 15

## 2025-04-08 ASSESSMENT — KERATOMETRY
OD_AXISANGLE_DEGREES: 018
OS_AXISANGLE_DEGREES: 165
OS_K1POWER_DIOPTERS: 43.00
OD_K1POWER_DIOPTERS: 42.50
OD_K2POWER_DIOPTERS: 44.25
OS_K2POWER_DIOPTERS: 44.00

## 2025-04-08 ASSESSMENT — REFRACTION_AUTOREFRACTION
OD_CYLINDER: -1.75
OD_AXIS: 105
OS_CYLINDER: -1.50
OD_SPHERE: +0.75
OS_SPHERE: +0.50
OS_AXIS: 076

## 2025-04-08 ASSESSMENT — REFRACTION_MANIFEST
OS_VA1: 20/25
OS_CYLINDER: -1.25
OS_AXIS: 076
OD_CYLINDER: -1.50
OD_VA1: 20/20
OD_SPHERE: +0.50
OD_AXIS: 105
OS_ADD: +2.50
OS_SPHERE: +0.50
OD_ADD: +2.50

## 2025-04-08 ASSESSMENT — CORNEAL EDEMA CLINICAL DESCRIPTION: OD_CORNEALEDEMA: ABSENT

## 2025-04-08 ASSESSMENT — VISUAL ACUITY
OD_BCVA: 20/30-1
OS_BCVA: 20/25-2

## 2025-04-22 ENCOUNTER — APPOINTMENT (OUTPATIENT)
Dept: ENDOCRINOLOGY | Facility: CLINIC | Age: 70
End: 2025-04-22
Payer: MEDICARE

## 2025-04-22 DIAGNOSIS — E10.65 TYPE 1 DIABETES MELLITUS WITH HYPERGLYCEMIA: ICD-10-CM

## 2025-04-22 PROCEDURE — G0108 DIAB MANAGE TRN  PER INDIV: CPT | Mod: GA

## 2025-05-29 ENCOUNTER — RX RENEWAL (OUTPATIENT)
Age: 70
End: 2025-05-29

## 2025-06-02 ENCOUNTER — OFFICE (OUTPATIENT)
Dept: URBAN - METROPOLITAN AREA CLINIC 103 | Facility: CLINIC | Age: 70
Setting detail: OPHTHALMOLOGY
End: 2025-06-02
Payer: MEDICARE

## 2025-06-02 DIAGNOSIS — E10.3313: ICD-10-CM

## 2025-06-02 PROCEDURE — 92134 CPTRZ OPH DX IMG PST SGM RTA: CPT | Performed by: OPHTHALMOLOGY

## 2025-06-02 PROCEDURE — 67028 INJECTION EYE DRUG: CPT | Mod: 79,50 | Performed by: OPHTHALMOLOGY

## 2025-06-02 PROCEDURE — 92235 FLUORESCEIN ANGRPH MLTIFRAME: CPT | Performed by: OPHTHALMOLOGY

## 2025-06-02 ASSESSMENT — KERATOMETRY
OD_K1POWER_DIOPTERS: 42.50
OS_K2POWER_DIOPTERS: 44.00
OS_AXISANGLE_DEGREES: 165
OD_K2POWER_DIOPTERS: 44.25
OS_K1POWER_DIOPTERS: 43.00
OD_AXISANGLE_DEGREES: 018

## 2025-06-02 ASSESSMENT — REFRACTION_AUTOREFRACTION
OD_CYLINDER: -1.75
OS_AXIS: 076
OD_SPHERE: +0.75
OD_AXIS: 105
OS_SPHERE: +0.50
OS_CYLINDER: -1.50

## 2025-06-02 ASSESSMENT — VISUAL ACUITY
OS_BCVA: 20/30
OD_BCVA: 20/50-2

## 2025-06-02 ASSESSMENT — CONFRONTATIONAL VISUAL FIELD TEST (CVF)
OD_FINDINGS: FULL
OS_FINDINGS: FULL

## 2025-06-02 ASSESSMENT — CORNEAL EDEMA CLINICAL DESCRIPTION: OD_CORNEALEDEMA: ABSENT

## 2025-06-02 ASSESSMENT — TONOMETRY
OD_IOP_MMHG: 15
OS_IOP_MMHG: 17

## 2025-06-11 ENCOUNTER — OFFICE (OUTPATIENT)
Dept: URBAN - METROPOLITAN AREA CLINIC 103 | Facility: CLINIC | Age: 70
Setting detail: OPHTHALMOLOGY
End: 2025-06-11
Payer: MEDICARE

## 2025-06-11 DIAGNOSIS — E10.3311: ICD-10-CM

## 2025-06-11 PROCEDURE — 67210 TREATMENT OF RETINAL LESION: CPT | Mod: RT | Performed by: OPHTHALMOLOGY

## 2025-06-11 ASSESSMENT — CONFRONTATIONAL VISUAL FIELD TEST (CVF)
OD_FINDINGS: FULL
OS_FINDINGS: FULL

## 2025-06-11 ASSESSMENT — REFRACTION_AUTOREFRACTION
OD_AXIS: 105
OS_CYLINDER: -1.50
OS_AXIS: 076
OD_CYLINDER: -1.75
OD_SPHERE: +0.75
OS_SPHERE: +0.50

## 2025-06-11 ASSESSMENT — KERATOMETRY
OS_K1POWER_DIOPTERS: 43.00
OD_K1POWER_DIOPTERS: 42.50
OS_K2POWER_DIOPTERS: 44.00
OS_AXISANGLE_DEGREES: 165
OD_AXISANGLE_DEGREES: 018
OD_K2POWER_DIOPTERS: 44.25

## 2025-06-11 ASSESSMENT — TONOMETRY
OS_IOP_MMHG: 12
OD_IOP_MMHG: 12

## 2025-06-11 ASSESSMENT — VISUAL ACUITY
OS_BCVA: 20/30
OD_BCVA: 20/50

## 2025-06-11 ASSESSMENT — CORNEAL EDEMA CLINICAL DESCRIPTION: OD_CORNEALEDEMA: ABSENT

## 2025-07-07 ENCOUNTER — OFFICE (OUTPATIENT)
Dept: URBAN - METROPOLITAN AREA CLINIC 103 | Facility: CLINIC | Age: 70
Setting detail: OPHTHALMOLOGY
End: 2025-07-07
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
## 2025-07-07 PROCEDURE — NO SHOW FE NO SHOW FEE: Performed by: OPHTHALMOLOGY

## 2025-07-22 ENCOUNTER — APPOINTMENT (OUTPATIENT)
Dept: ENDOCRINOLOGY | Facility: CLINIC | Age: 70
End: 2025-07-22
Payer: MEDICARE

## 2025-07-22 DIAGNOSIS — E10.65 TYPE 1 DIABETES MELLITUS WITH HYPERGLYCEMIA: ICD-10-CM

## 2025-07-22 PROCEDURE — G0108 DIAB MANAGE TRN  PER INDIV: CPT | Mod: GA

## 2025-07-23 RX ORDER — SYRINGE-NEEDLE,INSULIN,0.5 ML 31 GX5/16"
31G X 5/16" SYRINGE, EMPTY DISPOSABLE MISCELLANEOUS
Qty: 200 | Refills: 3 | Status: ACTIVE | COMMUNITY
Start: 2025-07-22 | End: 1900-01-01

## 2025-08-05 ENCOUNTER — RX ONLY (RX ONLY)
Age: 70
End: 2025-08-05

## 2025-08-05 ENCOUNTER — OFFICE (OUTPATIENT)
Dept: URBAN - METROPOLITAN AREA CLINIC 103 | Facility: CLINIC | Age: 70
Setting detail: OPHTHALMOLOGY
End: 2025-08-05
Payer: MEDICARE

## 2025-08-05 DIAGNOSIS — H40.033: ICD-10-CM

## 2025-08-05 DIAGNOSIS — H16.223: ICD-10-CM

## 2025-08-05 PROCEDURE — 99024 POSTOP FOLLOW-UP VISIT: CPT | Performed by: OPTOMETRIST

## 2025-08-05 PROCEDURE — 92250 FUNDUS PHOTOGRAPHY W/I&R: CPT | Performed by: OPTOMETRIST

## 2025-08-05 ASSESSMENT — REFRACTION_AUTOREFRACTION
OS_CYLINDER: -3.25
OS_SPHERE: +1.25
OS_AXIS: 083
OD_SPHERE: +0.75
OD_CYLINDER: -1.75
OD_AXIS: 102

## 2025-08-05 ASSESSMENT — KERATOMETRY
OD_K1POWER_DIOPTERS: 43.00
OS_AXISANGLE_DEGREES: 162
OD_K2POWER_DIOPTERS: 44.50
OS_K1POWER_DIOPTERS: 43.00
OS_K2POWER_DIOPTERS: 44.25
OD_AXISANGLE_DEGREES: 015

## 2025-08-05 ASSESSMENT — TONOMETRY
OS_IOP_MMHG: 15
OD_IOP_MMHG: 15

## 2025-08-05 ASSESSMENT — VISUAL ACUITY
OS_BCVA: 20/30+
OD_BCVA: 20/40+

## 2025-08-05 ASSESSMENT — SUPERFICIAL PUNCTATE KERATITIS (SPK)
OS_SPK: T
OD_SPK: T

## 2025-08-05 ASSESSMENT — CORNEAL EDEMA CLINICAL DESCRIPTION: OD_CORNEALEDEMA: ABSENT

## 2025-08-13 ENCOUNTER — OFFICE (OUTPATIENT)
Dept: URBAN - METROPOLITAN AREA CLINIC 103 | Facility: CLINIC | Age: 70
Setting detail: OPHTHALMOLOGY
End: 2025-08-13
Payer: MEDICARE

## 2025-08-13 DIAGNOSIS — E10.3312: ICD-10-CM

## 2025-08-13 PROBLEM — H16.223 DRY EYE SYNDROME K SICCA; BOTH EYES: Status: ACTIVE | Noted: 2025-08-05

## 2025-08-13 PROCEDURE — 67210 TREATMENT OF RETINAL LESION: CPT | Mod: 79,LT | Performed by: OPHTHALMOLOGY

## 2025-08-13 ASSESSMENT — SUPERFICIAL PUNCTATE KERATITIS (SPK)
OS_SPK: T
OD_SPK: T

## 2025-08-13 ASSESSMENT — REFRACTION_AUTOREFRACTION
OS_CYLINDER: -3.25
OD_SPHERE: +0.75
OD_CYLINDER: -1.75
OS_SPHERE: +1.25
OS_AXIS: 083
OD_AXIS: 102

## 2025-08-13 ASSESSMENT — KERATOMETRY
OS_K1POWER_DIOPTERS: 43.00
OS_AXISANGLE_DEGREES: 162
OD_K1POWER_DIOPTERS: 43.00
OS_K2POWER_DIOPTERS: 44.25
OD_K2POWER_DIOPTERS: 44.50
OD_AXISANGLE_DEGREES: 015

## 2025-08-13 ASSESSMENT — VISUAL ACUITY
OS_BCVA: 20/30-1
OD_BCVA: 20/40-1

## 2025-08-13 ASSESSMENT — TONOMETRY
OD_IOP_MMHG: 16
OS_IOP_MMHG: 12

## 2025-08-13 ASSESSMENT — CONFRONTATIONAL VISUAL FIELD TEST (CVF)
OS_FINDINGS: FULL
OD_FINDINGS: FULL

## 2025-08-13 ASSESSMENT — CORNEAL EDEMA CLINICAL DESCRIPTION: OD_CORNEALEDEMA: ABSENT

## 2025-09-02 LAB
HBA1C MFR BLD HPLC: 5.9
LDLC SERPL DIRECT ASSAY-MCNC: 94
MICROALBUMIN/CREAT 24H UR-RTO: 23
TSH SERPL-ACNC: 1.16

## 2025-09-15 ENCOUNTER — APPOINTMENT (OUTPATIENT)
Dept: ENDOCRINOLOGY | Facility: CLINIC | Age: 70
End: 2025-09-15
Payer: MEDICARE

## 2025-09-15 VITALS
SYSTOLIC BLOOD PRESSURE: 120 MMHG | BODY MASS INDEX: 19.04 KG/M2 | OXYGEN SATURATION: 99 % | DIASTOLIC BLOOD PRESSURE: 70 MMHG | HEIGHT: 70 IN | HEART RATE: 68 BPM | WEIGHT: 133 LBS

## 2025-09-15 DIAGNOSIS — E10.65 TYPE 1 DIABETES MELLITUS WITH HYPERGLYCEMIA: ICD-10-CM

## 2025-09-15 PROCEDURE — G2211 COMPLEX E/M VISIT ADD ON: CPT

## 2025-09-15 PROCEDURE — 99214 OFFICE O/P EST MOD 30 MIN: CPT
